# Patient Record
Sex: FEMALE | Race: WHITE | Employment: FULL TIME | URBAN - METROPOLITAN AREA
[De-identification: names, ages, dates, MRNs, and addresses within clinical notes are randomized per-mention and may not be internally consistent; named-entity substitution may affect disease eponyms.]

---

## 2017-03-27 DIAGNOSIS — R51.9 DAILY HEADACHE: ICD-10-CM

## 2017-03-28 RX ORDER — AMITRIPTYLINE HYDROCHLORIDE 10 MG/1
TABLET ORAL
Qty: 270 TABLET | Refills: 1 | Status: SHIPPED | OUTPATIENT
Start: 2017-03-28 | End: 2017-07-24

## 2017-06-28 ENCOUNTER — TELEPHONE (OUTPATIENT)
Dept: FAMILY MEDICINE | Facility: CLINIC | Age: 47
End: 2017-06-28

## 2017-06-28 DIAGNOSIS — D50.8 OTHER IRON DEFICIENCY ANEMIA: Primary | ICD-10-CM

## 2017-06-28 DIAGNOSIS — Z13.220 LIPID SCREENING: ICD-10-CM

## 2017-06-28 DIAGNOSIS — Z13.1 SCREENING FOR DIABETES MELLITUS: ICD-10-CM

## 2017-06-28 NOTE — TELEPHONE ENCOUNTER
Please enter order for mammo screening. Pt declines lumps, pain or implants. See note below.    Dianne Graves CMA  Diabetes and Nutrition Scheduling  Central Scheduling

## 2017-06-28 NOTE — TELEPHONE ENCOUNTER
Sarah called this morning to schedule her yearly visits. Mammo scheduled for 7/3/17 [facility]. Yearly fasting labs scheduled for 7/15/17 at [facility]. Physical scheduled with Vi on 7/19/17. May we ask Vi to order annual lab work for patient's upcoming lab visit? Also Please enter order for mammo screening. Pt declines lumps, pain or implants. Thank you.     Dianne Graves [facility]  Diabetes and Nutrition Scheduling  Central Scheduling

## 2017-06-28 NOTE — TELEPHONE ENCOUNTER
Need previsit labs-see orders and close encounter if nothing else needed./Patti Flores,       Physical 7/19/17. Please also enter mammogram order.

## 2017-07-03 ENCOUNTER — RADIANT APPOINTMENT (OUTPATIENT)
Dept: MAMMOGRAPHY | Facility: CLINIC | Age: 47
End: 2017-07-03
Payer: COMMERCIAL

## 2017-07-03 DIAGNOSIS — Z12.31 ENCOUNTER FOR MAMMOGRAM TO ESTABLISH BASELINE MAMMOGRAM: ICD-10-CM

## 2017-07-03 PROCEDURE — G0202 SCR MAMMO BI INCL CAD: HCPCS | Performed by: STUDENT IN AN ORGANIZED HEALTH CARE EDUCATION/TRAINING PROGRAM

## 2017-07-15 DIAGNOSIS — D50.8 OTHER IRON DEFICIENCY ANEMIA: ICD-10-CM

## 2017-07-15 DIAGNOSIS — Z13.1 SCREENING FOR DIABETES MELLITUS: ICD-10-CM

## 2017-07-15 DIAGNOSIS — Z13.220 LIPID SCREENING: ICD-10-CM

## 2017-07-15 LAB
BASOPHILS # BLD AUTO: 0 10E9/L (ref 0–0.2)
BASOPHILS NFR BLD AUTO: 0.5 %
DIFFERENTIAL METHOD BLD: ABNORMAL
EOSINOPHIL # BLD AUTO: 0.1 10E9/L (ref 0–0.7)
EOSINOPHIL NFR BLD AUTO: 1.9 %
ERYTHROCYTE [DISTWIDTH] IN BLOOD BY AUTOMATED COUNT: 13.7 % (ref 10–15)
HCT VFR BLD AUTO: 40.5 % (ref 35–47)
HGB BLD-MCNC: 13.2 G/DL (ref 11.7–15.7)
LYMPHOCYTES # BLD AUTO: 1.3 10E9/L (ref 0.8–5.3)
LYMPHOCYTES NFR BLD AUTO: 36.3 %
MCH RBC QN AUTO: 31.9 PG (ref 26.5–33)
MCHC RBC AUTO-ENTMCNC: 32.6 G/DL (ref 31.5–36.5)
MCV RBC AUTO: 98 FL (ref 78–100)
MONOCYTES # BLD AUTO: 0.3 10E9/L (ref 0–1.3)
MONOCYTES NFR BLD AUTO: 8.8 %
NEUTROPHILS # BLD AUTO: 1.9 10E9/L (ref 1.6–8.3)
NEUTROPHILS NFR BLD AUTO: 52.5 %
PLATELET # BLD AUTO: 224 10E9/L (ref 150–450)
RBC # BLD AUTO: 4.14 10E12/L (ref 3.8–5.2)
WBC # BLD AUTO: 3.6 10E9/L (ref 4–11)

## 2017-07-15 PROCEDURE — 82947 ASSAY GLUCOSE BLOOD QUANT: CPT | Performed by: PHYSICIAN ASSISTANT

## 2017-07-15 PROCEDURE — 82728 ASSAY OF FERRITIN: CPT | Performed by: PHYSICIAN ASSISTANT

## 2017-07-15 PROCEDURE — 36415 COLL VENOUS BLD VENIPUNCTURE: CPT | Performed by: PHYSICIAN ASSISTANT

## 2017-07-15 PROCEDURE — 80061 LIPID PANEL: CPT | Performed by: PHYSICIAN ASSISTANT

## 2017-07-15 PROCEDURE — 85025 COMPLETE CBC W/AUTO DIFF WBC: CPT | Performed by: PHYSICIAN ASSISTANT

## 2017-07-17 LAB
CHOLEST SERPL-MCNC: 132 MG/DL
FERRITIN SERPL-MCNC: 12 NG/ML (ref 8–252)
GLUCOSE SERPL-MCNC: 85 MG/DL (ref 70–99)
HDLC SERPL-MCNC: 74 MG/DL
LDLC SERPL CALC-MCNC: 49 MG/DL
NONHDLC SERPL-MCNC: 58 MG/DL
TRIGL SERPL-MCNC: 44 MG/DL

## 2017-07-18 ENCOUNTER — MYC MEDICAL ADVICE (OUTPATIENT)
Dept: FAMILY MEDICINE | Facility: CLINIC | Age: 47
End: 2017-07-18

## 2017-07-23 ENCOUNTER — OFFICE VISIT (OUTPATIENT)
Dept: URGENT CARE | Facility: URGENT CARE | Age: 47
End: 2017-07-23
Payer: COMMERCIAL

## 2017-07-23 VITALS
TEMPERATURE: 98.3 F | SYSTOLIC BLOOD PRESSURE: 115 MMHG | HEART RATE: 77 BPM | WEIGHT: 119.4 LBS | BODY MASS INDEX: 21.15 KG/M2 | DIASTOLIC BLOOD PRESSURE: 75 MMHG

## 2017-07-23 DIAGNOSIS — S61.215A LACERATION OF LEFT RING FINGER WITHOUT FOREIGN BODY WITHOUT DAMAGE TO NAIL, INITIAL ENCOUNTER: Primary | ICD-10-CM

## 2017-07-23 DIAGNOSIS — S61.217A LACERATION OF LEFT LITTLE FINGER WITHOUT FOREIGN BODY WITHOUT DAMAGE TO NAIL, INITIAL ENCOUNTER: ICD-10-CM

## 2017-07-23 PROCEDURE — 12001 RPR S/N/AX/GEN/TRNK 2.5CM/<: CPT | Performed by: FAMILY MEDICINE

## 2017-07-23 NOTE — PATIENT INSTRUCTIONS
Extremity Laceration: Sutures, Staples, or Tape  A laceration is a cut through the skin. If it is deep, it may require stitches (sutures) or staples to close so it can heal. Minor cuts may be treated with surgical tape closures.   X-rays may be done if something may have entered the skin through the cut. You may also need a tetanus shot if you are not up to date on this vaccination.  Home care    Follow the health care provider s instructions on how to care for the cut.    Wash your hands with soap and warm water before and after caring for your wound. This is to help prevent infection.    Keep the wound clean and dry. If a bandage was applied and it becomes wet or dirty, replace it. Otherwise, leave it in place for the first 24 hours, then change it once a day or as directed.    If sutures or staples were used, clean the wound daily:    After removing the bandage, wash the area with soap and water. Use a wet cotton swab to loosen and remove any blood or crust that forms.    After cleaning, keep the wound clean and dry. Talk with your doctor before applying any antibiotic ointment to the wound. Reapply the bandage.    You may remove the bandage to shower as usual after the first 24 hours, but do not soak the area in water (no swimming) until the stitches or staples are removed.    If surgical tape closures were used, keep the area clean and dry. If it becomes wet, blot it dry with a towel.    The doctor may prescribe an antibiotic cream or ointment to prevent infection. Do not stop taking this medication until you have finished the prescribed course or the doctor tells you to stop. The doctor may also prescribe medications for pain. Follow the doctor s instructions for taking these medications.    Avoid activities that may reopen your wound.  Follow-up care  Follow up with your health care provider. Most skin wounds heal within ten days. However, an infection may sometimes occur despite proper treatment.  Therefore, check the wound daily for the signs of infection listed below. Stitches and staples should be removed within 7 14 days. If surgical tape closures were used, you may remove them after 10 days if they have not fallen off by then.   When to seek medical advice  Call your health care provider right away if any of these occur:    Wound bleeding not controlled by direct pressure    Signs of infection, including increasing pain in the wound, increasing wound redness or swelling, or pus or bad odor coming from the wound    Fever of 100.4 F (38 C) or higher or as directed by your healthcare provider    Stitches or staples come apart or fall out or surgical tape falls off before 7 days    Wound edges re-open    Wound changes colors    Numbness around the wound     Decreased movement around the injured area  Date Last Reviewed: 6/14/2015 2000-2017 [employer]. [address] [place] [address]. All rights reserved. This information is not intended as a substitute for professional medical care. Always follow your healthcare professional's instructions.        Laceration: Skin Adhesive  A laceration is a cut through the skin. You have a laceration that your healthcare provider has closed with skin adhesive, a type of skin glue.  Home care  You may take acetaminophen or ibuprofen for pain, unless your provider prescribed another pain medicine.  If you have chronic liver or kidney disease or ever had a stomach ulcer or gastrointestinal bleeding, talk with your healthcare provider before using these medicines.  General care    Keep the wound clean and dry. You may shower or bathe as usual, but do not use soaps, lotions, or ointments on the wound area. Do not scrub the wound. After bathing, pat the wound dry with a soft towel.    Do not scratch, rub, or pick at the adhesive film. Do not place tape directly over the film.    Do not apply liquids (such as peroxide), ointments, or creams to the wound  while the film is in place.    Most skin wounds heal without problems. However, an infection sometimes occurs despite proper treatment. Therefore, watch for the signs of infection listed below.  Follow-up care  Follow up with your healthcare provider, or as advised. The adhesive film will fall off in 5 to 7 days.  When to seek medical advice  Call your healthcare provider right away if any of these occur:    Signs of infection:    Fever of 100.4 F (38 C) or higher, or as advised by your healthcare provider    Increasing pain in the wound    Increasing redness or swelling    Pus coming from the wound    Wound bleeds more than a small amount or bleeding doesn t stop    Glue comes off earlier than expected and the wound edges come apart    You feel numbness or weakness in the wound area that doesn t go away  Date Last Reviewed: 6/1/2016 2000-2017 [employer]. [address] [place] [address], [place] [address]. All rights reserved. This information is not intended as a substitute for professional medical care. Always follow your healthcare professional's instructions.

## 2017-07-23 NOTE — MR AVS SNAPSHOT
After Visit Summary   7/23/2017    Sarah Padilla    MRN: [id number]           Patient Information     Date Of Birth          1970        Visit Information        Provider Department      7/23/2017 4:35 PM Catherine Lopez MD [facility]        Today's Diagnoses     Laceration of left ring finger without foreign body without damage to nail, initial encounter    -  1    Laceration of left little finger without foreign body without damage to nail, initial encounter          Care Instructions      Extremity Laceration: Sutures, Staples, or Tape  A laceration is a cut through the skin. If it is deep, it may require stitches (sutures) or staples to close so it can heal. Minor cuts may be treated with surgical tape closures.   X-rays may be done if something may have entered the skin through the cut. You may also need a tetanus shot if you are not up to date on this vaccination.  Home care    Follow the health care provider s instructions on how to care for the cut.    Wash your hands with soap and warm water before and after caring for your wound. This is to help prevent infection.    Keep the wound clean and dry. If a bandage was applied and it becomes wet or dirty, replace it. Otherwise, leave it in place for the first 24 hours, then change it once a day or as directed.    If sutures or staples were used, clean the wound daily:    After removing the bandage, wash the area with soap and water. Use a wet cotton swab to loosen and remove any blood or crust that forms.    After cleaning, keep the wound clean and dry. Talk with your doctor before applying any antibiotic ointment to the wound. Reapply the bandage.    You may remove the bandage to shower as usual after the first 24 hours, but do not soak the area in water (no swimming) until the stitches or staples are removed.    If surgical tape closures were used, keep the area clean and dry. If it becomes wet, blot it dry with a  towel.    The doctor may prescribe an antibiotic cream or ointment to prevent infection. Do not stop taking this medication until you have finished the prescribed course or the doctor tells you to stop. The doctor may also prescribe medications for pain. Follow the doctor s instructions for taking these medications.    Avoid activities that may reopen your wound.  Follow-up care  Follow up with your health care provider. Most skin wounds heal within ten days. However, an infection may sometimes occur despite proper treatment. Therefore, check the wound daily for the signs of infection listed below. Stitches and staples should be removed within 7-14 days. If surgical tape closures were used, you may remove them after 10 days if they have not fallen off by then.   When to seek medical advice  Call your health care provider right away if any of these occur:    Wound bleeding not controlled by direct pressure    Signs of infection, including increasing pain in the wound, increasing wound redness or swelling, or pus or bad odor coming from the wound    Fever of 100.4 F (38 C) or higher or as directed by your healthcare provider    Stitches or staples come apart or fall out or surgical tape falls off before 7 days    Wound edges re-open    Wound changes colors    Numbness around the wound     Decreased movement around the injured area  Date Last Reviewed: 6/14/2015 2000-2017 [employer]. [address] [place] [address], [place] [address]. All rights reserved. This information is not intended as a substitute for professional medical care. Always follow your healthcare professional's instructions.        Laceration: Skin Adhesive  A laceration is a cut through the skin. You have a laceration that your healthcare provider has closed with skin adhesive, a type of skin glue.  Home care  You may take acetaminophen or ibuprofen for pain, unless your provider prescribed another pain medicine.  If you have chronic liver or  kidney disease or ever had a stomach ulcer or gastrointestinal bleeding, talk with your healthcare provider before using these medicines.  General care    Keep the wound clean and dry. You may shower or bathe as usual, but do not use soaps, lotions, or ointments on the wound area. Do not scrub the wound. After bathing, pat the wound dry with a soft towel.    Do not scratch, rub, or pick at the adhesive film. Do not place tape directly over the film.    Do not apply liquids (such as peroxide), ointments, or creams to the wound while the film is in place.    Most skin wounds heal without problems. However, an infection sometimes occurs despite proper treatment. Therefore, watch for the signs of infection listed below.  Follow-up care  Follow up with your healthcare provider, or as advised. The adhesive film will fall off in 5 to 7 days.  When to seek medical advice  Call your healthcare provider right away if any of these occur:    Signs of infection:    Fever of 100.4 F (38 C) or higher, or as advised by your healthcare provider    Increasing pain in the wound    Increasing redness or swelling    Pus coming from the wound    Wound bleeds more than a small amount or bleeding doesn t stop    Glue comes off earlier than expected and the wound edges come apart    You feel numbness or weakness in the wound area that doesn t go away  Date Last Reviewed: 6/1/2016 2000-2017 [employer]. [address] [place] [address]. All rights reserved. This information is not intended as a substitute for professional medical care. Always follow your healthcare professional's instructions.                Follow-ups after your visit        Your next 10 appointments already scheduled     Jul 24, 2017  2:10 PM CDT   PHYS[facility] with Kristen M Kehr, [facility]-C   [facility]    [address] [facility] [address] [phone]              Who to contact     If you have  questions or need follow up information about today's clinic visit or your schedule please contact [facility] directly at [phone].  Normal or non-critical lab and imaging results will be communicated to you by MyChart, letter or phone within 4 business days after the clinic has received the results. If you do not hear from us within 7 days, please contact the clinic through [employer] or phone. If you have a critical or abnormal lab result, we will notify you by phone as soon as possible.  Submit refill requests through [employer] or call your pharmacy and they will forward the refill request to us. Please allow 3 business days for your refill to be completed.          Additional Information About Your Visit        [employer] Information     [employer] gives you secure access to your electronic health record. If you see a primary care provider, you can also send messages to your care team and make appointments. If you have questions, please call your primary care clinic.  If you do not have a primary care provider, please call [phone] and they will assist you.        Care EveryWhere ID     This is your Care EveryWhere ID. This could be used by other organizations to access your [place] medical records  [id number]        Your Vitals Were     Pulse Temperature BMI (Body Mass Index)             77 98.3  F (36.8  C) (Oral) 21.15 kg/m2          Blood Pressure from Last 3 Encounters:   07/23/17 115/75   09/26/16 125/77   04/25/16 121/81    Weight from Last 3 Encounters:   07/23/17 119 lb 6.4 oz (54.2 kg)   09/26/16 126 lb (57.2 kg)   04/25/16 125 lb (56.7 kg)              We Performed the Following     N BLOCK INJ, COMMON DIGIT     REPAIR SUPERFICIAL, WOUND BODY < =2.5CM     REPAIR SUPERFICIAL, WOUND BODY < =2.5CM     WOUND CLOSURE BY ADHESIVE        Primary Care Provider Office Phone # Fax #    Kristen M Kehr, PA-C [phone]       [facility] [address] [facility]  [address]        Equal Access to Services     [facility]: Hadii jose peoples keila Clarke, wajoseda luqadaha, qaybta kakyungtammy wagner, frederic tyler. So [facility] [phone].    ATENCIÓN: Si habla español, tiene a allen disposición servicios gratuitos de asistencia lingüística. Faizaame al [phone].    We comply with applicable federal civil rights laws and [place] laws. We do not discriminate on the basis of race, color, national origin, age, disability sex, sexual orientation or gender identity.            Thank you!     Thank you for choosing [facility]  [facility]. Our goal is always to provide you with excellent care. Hearing back from our patients is one way we can continue to improve our services. Please take a few minutes to complete the written survey that you may receive in the mail after your visit with us. Thank you!             Your Updated Medication List - Protect others around you: Learn how to safely use, store and throw away your medicines at www.disposemymeds.org.          This list is accurate as of: 7/23/17  5:39 PM.  Always use your most recent med list.                   Brand Name Dispense Instructions for use Diagnosis    amitriptyline 10 MG tablet    ELAVIL    270 tablet    3 tabs (30 mg) by mouth at bedtime for prevention of headaches.    Daily headache       eletriptan 20 MG tablet    RELPAX    18 tablet    Take 1-2 tablets (20-40 mg) by mouth at onset of headache for migraine May repeat in 2 hours. Max 4 tablets/24 hours.    Migraine without aura and without status migrainosus, not intractable       ferrous sulfate 325 (65 FE) MG tablet    IRON    180 tablet    Take 1 tablet (325 mg) by mouth 2 times daily    Other iron deficiency anemia       ibuprofen 200 MG tablet   Generic drug:  ibuprofen      Take 200 mg by mouth every 4 hours as needed.        UNISOM PO      Take 1 tablet by mouth nightly as needed.

## 2017-07-23 NOTE — PROGRESS NOTES
SUBJECTIVE:                                                    Sarah Padilla is a 47 year old female who presents to clinic today for the following health issues:            Duration: today    Description (location/character/radiation): ring and pinky finger on left hand    Intensity:  moderate    Accompanying signs and symptoms: cut with knife while cooking     History (similar episodes/previous evaluation): None    Precipitating or alleviating factors: None    Therapies tried and outcome: applied pressure    Last tetanus booster within 10 years: yes     No Known Allergies    Past Medical History:   Diagnosis Date     Daily headache 10/2/2016     Inflammatory arthritis 4/4/2012     NO ACTIVE PROBLEMS          Current Outpatient Prescriptions on File Prior to Visit:  amitriptyline (ELAVIL) 10 MG tablet 3 tabs (30 mg) by mouth at bedtime for prevention of headaches.   eletriptan (RELPAX) 20 MG tablet Take 1-2 tablets (20-40 mg) by mouth at onset of headache for migraine May repeat in 2 hours. Max 4 tablets/24 hours.   ferrous sulfate (IRON) 325 (65 FE) MG tablet Take 1 tablet (325 mg) by mouth 2 times daily   Doxylamine Succinate, Sleep, (UNISOM PO) Take 1 tablet by mouth nightly as needed.   ibuprofen (IBU-200) 200 MG tablet Take 200 mg by mouth every 4 hours as needed.     No current facility-administered medications on file prior to visit.       ROS:  GEN: no fever or chills  CARDIAC: no chest pain or shortness of breath  SKIN: as above  Musculoskeletal: as above      OBJECTIVE:  Blood pressure 115/75, pulse 77, temperature 98.3  F (36.8  C), temperature source Oral, weight 119 lb 6.4 oz (54.2 kg), not currently breastfeeding.     The patient appears today in no acute distress.  Awake alert not in any acute cardiorespiratory distress  Psych: pleasant   Neurologic: No gross neurologic deficits  Skin: no obvious lesions or rashes  Laceration LOCATION: 2 lacerations  1. Left ring finger: radial side perpendicular  to the finger just going straight radially from the mid length of the nail about 1 cm bleeding mild going to subcutaneous tissue  2. Left pinky: radial side perpendicular to the finger just going straight radially from the mid length of the nail about 0.5 cm bleeding mild superficial      Tendon function, sensation intact. No weakness. Good pulses. Good range of motion  Cap refill intact.  Wound clean. No Foreign body noted.     Assessment:      ICD-10-CM    1. Laceration of left ring finger without foreign body without damage to nail, initial encounter S61.215A REPAIR SUPERFICIAL, WOUND BODY < =2.5CM   2. Laceration of left little finger without foreign body without damage to nail, initial encounter S61.217A REPAIR SUPERFICIAL, WOUND BODY < =2.5CM     WOUND CLOSURE BY ADHESIVE       PLAN:  Oral consent received.  Patient aware of risks which include but are not limited to infection, bleeding, iatrogenic injury. Alternative treatment plan was also discussed  Copious irrigation with normal saline done.  Digital block done at the left ring finger using lidocaine ONLY 1%  good anesthesia achieved.    Wound cleaned with saline. No FBs.    Laceration was closed using 2interrupted sutures using 5 ethilon  Bacitracin dressing.  Patient tolerated procedure well.      Left pinky repaired using dermabond     Advised to watch for signs or symptoms of infection. If with any concerns of infection advised to come in immediately to be reassessed. Routine wound care discussed.   Suture removal in 7-10 days for the ring finger.     Catherine Lopez MD

## 2017-07-24 ENCOUNTER — OFFICE VISIT (OUTPATIENT)
Dept: FAMILY MEDICINE | Facility: CLINIC | Age: 47
End: 2017-07-24
Payer: COMMERCIAL

## 2017-07-24 VITALS
SYSTOLIC BLOOD PRESSURE: 121 MMHG | HEART RATE: 64 BPM | WEIGHT: 119 LBS | DIASTOLIC BLOOD PRESSURE: 81 MMHG | BODY MASS INDEX: 21.09 KG/M2 | TEMPERATURE: 98.3 F | HEIGHT: 63 IN

## 2017-07-24 DIAGNOSIS — Z00.00 ROUTINE GENERAL MEDICAL EXAMINATION AT A HEALTH CARE FACILITY: Primary | ICD-10-CM

## 2017-07-24 DIAGNOSIS — M21.612 BILATERAL BUNIONS: ICD-10-CM

## 2017-07-24 DIAGNOSIS — M21.611 BILATERAL BUNIONS: ICD-10-CM

## 2017-07-24 DIAGNOSIS — D50.8 OTHER IRON DEFICIENCY ANEMIA: ICD-10-CM

## 2017-07-24 DIAGNOSIS — R51.9 DAILY HEADACHE: ICD-10-CM

## 2017-07-24 PROCEDURE — 99396 PREV VISIT EST AGE 40-64: CPT | Performed by: PHYSICIAN ASSISTANT

## 2017-07-24 RX ORDER — AMITRIPTYLINE HYDROCHLORIDE 10 MG/1
TABLET ORAL
Qty: 270 TABLET | Refills: 1 | Status: SHIPPED | OUTPATIENT
Start: 2017-07-24 | End: 2018-11-12

## 2017-07-24 RX ORDER — FERROUS SULFATE 325(65) MG
325 TABLET ORAL 2 TIMES DAILY
Qty: 180 TABLET | Refills: 3 | Status: SHIPPED | OUTPATIENT
Start: 2017-07-24

## 2017-07-24 NOTE — MR AVS SNAPSHOT
After Visit Summary   7/24/2017    Sarah Padilla    MRN: [id number]           Patient Information     Date Of Birth          1970        Visit Information        Provider Department      7/24/2017 2:10 PM Kehr, Kristen M, PA-C [facility]        Today's Diagnoses     Routine general medical examination at a health care facility    -  1    Daily headache        Other iron deficiency anemia        Bilateral bunions          Care Instructions      Preventive Health Recommendations  Female Ages 40 to 49    Yearly exam:     See your health care provider every year in order to  1. Review health changes.   2. Discuss preventive care.    3. Review your medicines if your doctor prescribed any.      Get a Pap test every three years (unless you have an abnormal result and your provider advises testing more often).      If you get Pap tests with HPV test, you only need to test every 5 years, unless you have an abnormal result. You do not need a Pap test if your uterus was removed (hysterectomy) and you have not had cancer.      You should be tested each year for STDs (sexually transmitted diseases), if you're at risk.       Ask your doctor if you should have a mammogram.      Have a colonoscopy (test for colon cancer) if someone in your family has had colon cancer or polyps before age 50.       Have a cholesterol test every 5 years.       Have a diabetes test (fasting glucose) after age 45. If you are at risk for diabetes, you should have this test every 3 years.    Shots: Get a flu shot each year. Get a tetanus shot every 10 years.     Nutrition:     Eat at least 5 servings of fruits and vegetables each day.    Eat whole-grain bread, whole-wheat pasta and brown rice instead of white grains and rice.    Talk to your provider about Calcium and Vitamin D.     Lifestyle    Exercise at least 150 minutes a week (an average of 30 minutes a day, 5 days a week). This will help you control your weight and  prevent disease.    Limit alcohol to one drink per day.    No smoking.     Wear sunscreen to prevent skin cancer.    See your dentist every six months for an exam and cleaning.          Follow-ups after your visit        Additional Services     PODIATRY/FOOT & ANKLE SURGERY REFERRAL       Your provider has referred you to: FMG: [facility] [phone]   [email or web address]  FM[facility]: [facility] [phone]   [email or web address]    Please be aware that coverage of these services is subject to the terms and limitations of your health insurance plan.  Call member services at your health plan with any benefit or coverage questions.      Please bring the following to your appointment:  >>   Any x-rays, CTs or MRIs which have been performed.  Contact the facility where they were done to arrange for  prior to your scheduled appointment.    >>   List of current medications   >>   This referral request   >>   Any documents/labs given to you for this referral                  Who to contact     If you have questions or need follow up information about today's clinic visit or your schedule please contact [facility] directly at [phone].  Normal or non-critical lab and imaging results will be communicated to you by MyChart, letter or phone within 4 business days after the clinic has received the results. If you do not hear from us within 7 days, please contact the clinic through MyCarehart or phone. If you have a critical or abnormal lab result, we will notify you by phone as soon as possible.  Submit refill requests through Comply365 or call your pharmacy and they will forward the refill request to us. Please allow 3 business days for your refill to be completed.          Additional Information About Your Visit        MyCareharCalm Information     Comply365 gives you secure access to your electronic health record. If you see a  "primary care provider, you can also send messages to your care team and make appointments. If you have questions, please call your primary care clinic.  If you do not have a primary care provider, please call [phone] and they will assist you.        Care EveryWhere ID     This is your Care EveryWhere ID. This could be used by other organizations to access your [facility] medical records  [id number]        Your Vitals Were     Pulse Temperature Height BMI (Body Mass Index)          64 98.3  F (36.8  C) (Oral) 5' 2.75\" (1.594 m) 21.25 kg/m2         Blood Pressure from Last 3 Encounters:   07/24/17 121/81   07/23/17 115/75   09/26/16 125/77    Weight from Last 3 Encounters:   07/24/17 119 lb (54 kg)   07/23/17 119 lb 6.4 oz (54.2 kg)   09/26/16 126 lb (57.2 kg)              We Performed the Following     PODIATRY/FOOT & ANKLE SURGERY REFERRAL          Where to get your medicines      These medications were sent to [facility] [address] [place] [address] - [facility] [place] [address] [facility] [address]     Phone:  [phone]     amitriptyline 10 MG tablet    ferrous sulfate 325 (65 FE) MG tablet          Primary Care Provider Office Phone # Fax #    Kristen M Kehr, PA-C [phone]       [facility] [address] [facility] [address]        Equal Access to Services     LINDA CRANE : Hadii aad ku hadasho Soomaali, waaxda luqadaha, qaybta kaalmada adeegyada, frederic tyler. So [facility] [phone].    ATENCIÓN: Si crystalla osorio, tiene a allen disposición servicios gratuitos de asistencia lingüística. Llame al [phone].    We comply with applicable federal civil rights laws and [place] laws. We do not discriminate on the basis of race, color, national origin, age, disability sex, sexual orientation or gender identity.            Thank you!     Thank you for choosing [facility]  for your care. Our goal is always to provide you with " excellent care. Hearing back from our patients is one way we can continue to improve our services. Please take a few minutes to complete the written survey that you may receive in the mail after your visit with us. Thank you!             Your Updated Medication List - Protect others around you: Learn how to safely use, store and throw away your medicines at www.disposemymeds.org.          This list is accurate as of: 7/24/17  2:33 PM.  Always use your most recent med list.                   Brand Name Dispense Instructions for use Diagnosis    amitriptyline 10 MG tablet    ELAVIL    270 tablet    3 tabs (30 mg) by mouth at bedtime for prevention of headaches.    Daily headache       eletriptan 20 MG tablet    RELPAX    18 tablet    Take 1-2 tablets (20-40 mg) by mouth at onset of headache for migraine May repeat in 2 hours. Max 4 tablets/24 hours.    Migraine without aura and without status migrainosus, not intractable       ferrous sulfate 325 (65 FE) MG tablet    IRON    180 tablet    Take 1 tablet (325 mg) by mouth 2 times daily    Other iron deficiency anemia       ibuprofen 200 MG tablet   Generic drug:  ibuprofen      Take 200 mg by mouth every 4 hours as needed.        UNISOM PO      Take 1 tablet by mouth nightly as needed.

## 2017-07-24 NOTE — NURSING NOTE
"Chief Complaint   Patient presents with     Physical       Initial /81  Pulse 64  Temp 98.3  F (36.8  C) (Oral)  Ht 5' 2.75\" (1.594 m)  Wt 119 lb (54 kg)  BMI 21.25 kg/m2 Estimated body mass index is 21.25 kg/(m^2) as calculated from the following:    Height as of this encounter: 5' 2.75\" (1.594 m).    Weight as of this encounter: 119 lb (54 kg).  Medication Reconciliation: complete    SAAD Fabian MA    "

## 2017-07-24 NOTE — PROGRESS NOTES
SUBJECTIVE:   CC: Sarah Padilla is an 47 year old woman who presents for preventive health visit.     Physical   Annual:     Getting at least 3 servings of Calcium per day::  Yes    Bi-annual eye exam::  NO    Dental care twice a year::  Yes    Sleep apnea or symptoms of sleep apnea::  None    Diet::  Other    Frequency of exercise::  4-5 days/week    Duration of exercise::  45-60 minutes    Taking medications regularly::  Yes    Medication side effects::  None    Additional concerns today::  YES        Today's PHQ-2 Score: PHQ-2 ( 1999 [employer]) 7/21/2017   Q1: Little interest or pleasure in doing things 0   Q2: Feeling down, depressed or hopeless 0   PHQ-2 Score 0   Q1: Little interest or pleasure in doing things Not at all   Q2: Feeling down, depressed or hopeless Not at all   PHQ-2 Score 0       Abuse: Current or Past(Physical, Sexual or Emotional)- No  Do you feel safe in your environment - Yes    Social History   Substance Use Topics     Smoking status: Never Smoker     Smokeless tobacco: Never Used     Alcohol use Yes      Comment: occasionally; socially     The patient does not drink >3 drinks per day nor >7 drinks per week.    Reviewed orders with patient.  Reviewed health maintenance and updated orders accordingly - Yes  Labs reviewed in EPIC  BP Readings from Last 3 Encounters:   07/24/17 121/81   07/23/17 115/75   09/26/16 125/77    Wt Readings from Last 3 Encounters:   07/24/17 119 lb (54 kg)   07/23/17 119 lb 6.4 oz (54.2 kg)   09/26/16 126 lb (57.2 kg)                  Patient Active Problem List   Diagnosis     CARDIOVASCULAR SCREENING; LDL GOAL LESS THAN 160     Inflammatory arthritis     Other iron deficiency anemia     Migraine without aura and without status migrainosus, not intractable     Daily headache     Past Surgical History:   Procedure Laterality Date     No History of Surgery         Social History   Substance Use Topics     Smoking status: Never Smoker     Smokeless tobacco: Never Used  "    Alcohol use Yes      Comment: occasionally; socially     Family History   Problem Relation Age of Onset     Alzheimer Disease Father      Hypertension Father      Respiratory Maternal Grandmother      Emphysema     CANCER Maternal Grandfather      Lung/Skin               Patient under age 50, mutual decision reflected in health maintenance.        Pertinent mammograms are reviewed under the imaging tab.  History of abnormal Pap smear:   NO - age 30- 65 PAP every 3 years recommended  Last 3 Pap Results:   PAP (no units)   Date Value   04/25/2016 NIL   03/29/2011 NIL       Reviewed and updated as needed this visit by clinical staffTobacco  Allergies  Meds  Med Hx  Surg Hx  Fam Hx  Soc Hx        Reviewed and updated as needed this visit by Provider        Past Medical History:   Diagnosis Date     Daily headache 10/2/2016     Daily headache      Inflammatory arthritis 4/4/2012     NO ACTIVE PROBLEMS       Past Surgical History:   Procedure Laterality Date     No History of Surgery         ROS:  C: NEGATIVE for fever, chills, change in weight  I: NEGATIVE for worrisome rashes, moles or lesions  E: NEGATIVE for vision changes or irritation  ENT: NEGATIVE for ear, mouth and throat problems  R: NEGATIVE for significant cough or SOB  B: NEGATIVE for masses, tenderness or discharge  CV: NEGATIVE for chest pain, palpitations or peripheral edema  GI: NEGATIVE for nausea, abdominal pain, heartburn, or change in bowel habits  : NEGATIVE for unusual urinary or vaginal symptoms. Periods are regular.  M: NEGATIVE for significant arthralgias or myalgia  N: NEGATIVE for weakness, dizziness or paresthesias  E: NEGATIVE for temperature intolerance, skin/hair changes  P: NEGATIVE for changes in mood or affect     OBJECTIVE:   /81  Pulse 64  Temp 98.3  F (36.8  C) (Oral)  Ht 5' 2.75\" (1.594 m)  Wt 119 lb (54 kg)  BMI 21.25 kg/m2  EXAM:  GENERAL: healthy, alert and no distress  EYES: Eyes grossly normal to " inspection, PERRL and conjunctivae and sclerae normal  HENT: ear canals and TM's normal, nose and mouth without ulcers or lesions  NECK: no adenopathy, no asymmetry, masses, or scars and thyroid normal to palpation  RESP: lungs clear to auscultation - no rales, rhonchi or wheezes  BREAST: normal without masses, tenderness or nipple discharge and no palpable axillary masses or adenopathy  CV: regular rate and rhythm, normal S1 S2, no S3 or S4, no murmur, click or rub, no peripheral edema and peripheral pulses strong  ABDOMEN: soft, nontender, no hepatosplenomegaly, no masses and bowel sounds normal  MS:She has bilateral bunions.   SKIN: no suspicious lesions or rashes  NEURO: Normal strength and tone, mentation intact and speech normal  PSYCH: mentation appears normal, affect normal/bright      Results for orders placed or performed in visit on 07/15/17   Ferritin   Result Value Ref Range    Ferritin 12 8 - 252 ng/mL   CBC with platelets differential   Result Value Ref Range    WBC 3.6 (L) 4.0 - 11.0 10e9/L    RBC Count 4.14 3.8 - 5.2 10e12/L    Hemoglobin 13.2 11.7 - 15.7 g/dL    Hematocrit 40.5 35.0 - 47.0 %    MCV 98 78 - 100 fl    MCH 31.9 26.5 - 33.0 pg    MCHC 32.6 31.5 - 36.5 g/dL    RDW 13.7 10.0 - 15.0 %    Platelet Count 224 150 - 450 10e9/L    Diff Method Automated Method     % Neutrophils 52.5 %    % Lymphocytes 36.3 %    % Monocytes 8.8 %    % Eosinophils 1.9 %    % Basophils 0.5 %    Absolute Neutrophil 1.9 1.6 - 8.3 10e9/L    Absolute Lymphocytes 1.3 0.8 - 5.3 10e9/L    Absolute Monocytes 0.3 0.0 - 1.3 10e9/L    Absolute Eosinophils 0.1 0.0 - 0.7 10e9/L    Absolute Basophils 0.0 0.0 - 0.2 10e9/L   Lipid panel reflex to direct LDL   Result Value Ref Range    Cholesterol 132 <200 mg/dL    Triglycerides 44 <150 mg/dL    HDL Cholesterol 74 >49 mg/dL    LDL Cholesterol Calculated 49 <100 mg/dL    Non HDL Cholesterol 58 <130 mg/dL   Glucose   Result Value Ref Range    Glucose 85 70 - 99 mg/dL  "        ASSESSMENT/PLAN:   1. Routine general medical examination at a health care facility  Health maintenance reviewed and updated.    2. Daily headache  Stable, improved since she has been eating healthy and exercising.   She continues to use the elavil, but has not needed the relpax in months.   - amitriptyline (ELAVIL) 10 MG tablet; 3 tabs (30 mg) by mouth at bedtime for prevention of headaches.  Dispense: 270 tablet; Refill: 1    3. Other iron deficiency anemia  Stable with the iron supplements.   Consider decreasing to once daily supplements  - ferrous sulfate (IRON) 325 (65 FE) MG tablet; Take 1 tablet (325 mg) by mouth 2 times daily  Dispense: 180 tablet; Refill: 3    4. Bilateral bunions  - PODIATRY/FOOT & ANKLE SURGERY REFERRAL    COUNSELING:  Reviewed preventive health counseling, as reflected in patient instructions       Regular exercise       Healthy diet/nutrition       reports that she has never smoked. She has never used smokeless tobacco.    Estimated body mass index is 21.25 kg/(m^2) as calculated from the following:    Height as of this encounter: 5' 2.75\" (1.594 m).    Weight as of this encounter: 119 lb (54 kg).         Counseling Resources:  ATP IV Guidelines  Pooled Cohorts Equation Calculator  Breast Cancer Risk Calculator  FRAX Risk Assessment  ICSI Preventive Guidelines  Dietary Guidelines for Americans, 2010  USDA's MyPlate  ASA Prophylaxis  Lung CA Screening    Kristen M. Kehr, PA-C  [facility]  "

## 2018-03-10 DIAGNOSIS — G43.009 MIGRAINE WITHOUT AURA AND WITHOUT STATUS MIGRAINOSUS, NOT INTRACTABLE: ICD-10-CM

## 2018-03-13 RX ORDER — RIZATRIPTAN BENZOATE 10 MG/1
TABLET, ORALLY DISINTEGRATING ORAL
Qty: 18 TABLET | Refills: 3 | OUTPATIENT
Start: 2018-03-13

## 2018-03-29 ENCOUNTER — MYC REFILL (OUTPATIENT)
Dept: FAMILY MEDICINE | Facility: CLINIC | Age: 48
End: 2018-03-29

## 2018-03-29 DIAGNOSIS — G43.009 MIGRAINE WITHOUT AURA AND WITHOUT STATUS MIGRAINOSUS, NOT INTRACTABLE: ICD-10-CM

## 2018-03-29 RX ORDER — ELETRIPTAN HYDROBROMIDE 20 MG/1
20-40 TABLET, FILM COATED ORAL
Qty: 18 TABLET | Refills: 1 | Status: CANCELLED | OUTPATIENT
Start: 2018-03-29

## 2018-03-29 RX ORDER — RIZATRIPTAN BENZOATE 10 MG/1
10 TABLET, ORALLY DISINTEGRATING ORAL
Qty: 18 TABLET | Refills: 0 | Status: SHIPPED | OUTPATIENT
Start: 2018-03-29 | End: 2018-11-12

## 2018-03-29 NOTE — TELEPHONE ENCOUNTER
Message from [employer]:  Original authorizing provider: Kristen M. Kehr, PA-C [facility] would like a refill of the following medications:  eletriptan (RELPAX) 20 MG tablet [Kristen M. Kehr, PA-C]    Preferred pharmacy: [facility] [address] [place], [place] - [address] [facility]    Comment:  I am not taking eletriptan - it was changed to rizatriptan dissolvable. I take these periodically for migraines, and am almost out. Can I have a renewal of this medication please? [facility] in [place].

## 2018-03-29 NOTE — TELEPHONE ENCOUNTER
Per protocol, will route encounter to be cosigned by provider for Verbal Orders.  Med list is updated.    Yas Schaffer RN

## 2018-10-19 ENCOUNTER — RADIANT APPOINTMENT (OUTPATIENT)
Dept: MAMMOGRAPHY | Facility: CLINIC | Age: 48
End: 2018-10-19
Attending: PHYSICIAN ASSISTANT
Payer: COMMERCIAL

## 2018-10-19 DIAGNOSIS — Z12.31 VISIT FOR SCREENING MAMMOGRAM: ICD-10-CM

## 2018-10-19 PROCEDURE — 77067 SCR MAMMO BI INCL CAD: CPT | Performed by: RADIOLOGY

## 2018-11-12 ENCOUNTER — OFFICE VISIT (OUTPATIENT)
Dept: FAMILY MEDICINE | Facility: CLINIC | Age: 48
End: 2018-11-12
Payer: COMMERCIAL

## 2018-11-12 VITALS
SYSTOLIC BLOOD PRESSURE: 108 MMHG | DIASTOLIC BLOOD PRESSURE: 78 MMHG | OXYGEN SATURATION: 100 % | HEIGHT: 63 IN | HEART RATE: 68 BPM | BODY MASS INDEX: 23.04 KG/M2 | TEMPERATURE: 97.6 F | WEIGHT: 130 LBS | RESPIRATION RATE: 16 BRPM

## 2018-11-12 DIAGNOSIS — Z12.4 SCREENING FOR MALIGNANT NEOPLASM OF CERVIX: ICD-10-CM

## 2018-11-12 DIAGNOSIS — Z00.00 ROUTINE GENERAL MEDICAL EXAMINATION AT A HEALTH CARE FACILITY: Primary | ICD-10-CM

## 2018-11-12 DIAGNOSIS — G43.009 MIGRAINE WITHOUT AURA AND WITHOUT STATUS MIGRAINOSUS, NOT INTRACTABLE: ICD-10-CM

## 2018-11-12 PROCEDURE — G0145 SCR C/V CYTO,THINLAYER,RESCR: HCPCS | Performed by: PHYSICIAN ASSISTANT

## 2018-11-12 PROCEDURE — 87624 HPV HI-RISK TYP POOLED RSLT: CPT | Performed by: PHYSICIAN ASSISTANT

## 2018-11-12 PROCEDURE — 99396 PREV VISIT EST AGE 40-64: CPT | Performed by: PHYSICIAN ASSISTANT

## 2018-11-12 RX ORDER — RIZATRIPTAN BENZOATE 10 MG/1
10 TABLET, ORALLY DISINTEGRATING ORAL
Qty: 18 TABLET | Refills: 3 | Status: SHIPPED | OUTPATIENT
Start: 2018-11-12 | End: 2019-12-05

## 2018-11-12 ASSESSMENT — ENCOUNTER SYMPTOMS
MYALGIAS: 0
EYE PAIN: 0
NAUSEA: 0
ABDOMINAL PAIN: 0
SHORTNESS OF BREATH: 0
NERVOUS/ANXIOUS: 0
DYSURIA: 0
DIZZINESS: 0
FREQUENCY: 0
DIARRHEA: 0
PARESTHESIAS: 0
COUGH: 0
CHILLS: 0
ARTHRALGIAS: 0
FEVER: 0
HEMATURIA: 0
HEARTBURN: 0
HEADACHES: 0
CONSTIPATION: 0
PALPITATIONS: 0
JOINT SWELLING: 0
BREAST MASS: 0
WEAKNESS: 0
SORE THROAT: 0
HEMATOCHEZIA: 0

## 2018-11-12 ASSESSMENT — PAIN SCALES - GENERAL: PAINLEVEL: NO PAIN (0)

## 2018-11-12 NOTE — MR AVS SNAPSHOT
After Visit Summary   11/12/2018    Sarah Padilla    MRN: [id number]           Patient Information     Date Of Birth          1970        Visit Information        Provider Department      11/12/2018 5:00 PM Kehr, Kristen M, PA-C [facility]        Today's Diagnoses     Routine general medical examination at a health care facility    -  1    Migraine without aura and without status migrainosus, not intractable        Screening for malignant neoplasm of cervix          Care Instructions      Preventive Health Recommendations  Female Ages 40 to 49    Yearly exam:     See your health care provider every year in order to  1. Review health changes.   2. Discuss preventive care.    3. Review your medicines if your doctor prescribed any.      Get a Pap test every three years (unless you have an abnormal result and your provider advises testing more often).      If you get Pap tests with HPV test, you only need to test every 5 years, unless you have an abnormal result. You do not need a Pap test if your uterus was removed (hysterectomy) and you have not had cancer.      You should be tested each year for STDs (sexually transmitted diseases), if you're at risk.     Ask your doctor if you should have a mammogram.      Have a colonoscopy (test for colon cancer) if someone in your family has had colon cancer or polyps before age 50.       Have a cholesterol test every 5 years.       Have a diabetes test (fasting glucose) after age 45. If you are at risk for diabetes, you should have this test every 3 years.    Shots: Get a flu shot each year. Get a tetanus shot every 10 years.     Nutrition:     Eat at least 5 servings of fruits and vegetables each day.    Eat whole-grain bread, whole-wheat pasta and brown rice instead of white grains and rice.    Get adequate Calcium and Vitamin D.      Lifestyle    Exercise at least 150 minutes a week (an average of 30 minutes a day, 5 days a week). This will  "help you control your weight and prevent disease.    Limit alcohol to one drink per day.    No smoking.     Wear sunscreen to prevent skin cancer.    See your dentist every six months for an exam and cleaning.          Follow-ups after your visit        Follow-up notes from your care team     Return in about 1 year (around 11/12/2019) for Routine Visit.      Who to contact     If you have questions or need follow up information about today's clinic visit or your schedule please contact [facility] directly at [phone].  Normal or non-critical lab and imaging results will be communicated to you by Apportablehart, letter or phone within 4 business days after the clinic has received the results. If you do not hear from us within 7 days, please contact the clinic through Gymboxt or phone. If you have a critical or abnormal lab result, we will notify you by phone as soon as possible.  Submit refill requests through Caterva or call your pharmacy and they will forward the refill request to us. Please allow 3 business days for your refill to be completed.          Additional Information About Your Visit        Apportablehart Information     Caterva gives you secure access to your electronic health record. If you see a primary care provider, you can also send messages to your care team and make appointments. If you have questions, please call your primary care clinic.  If you do not have a primary care provider, please call [phone] and they will assist you.        Care EveryWhere ID     This is your Care EveryWhere ID. This could be used by other organizations to access your [facility] medical records  [id number]        Your Vitals Were     Pulse Temperature Respirations Height Last Period Pulse Oximetry    68 97.6  F (36.4  C) (Oral) 16 5' 3\" (1.6 m) 11/01/2018 100%    BMI (Body Mass Index)                   23.03 kg/m2            Blood Pressure from Last 3 Encounters:   11/12/18 108/78   07/24/17 121/81   07/23/17 " 115/75    Weight from Last 3 Encounters:   11/12/18 130 lb (59 kg)   07/24/17 119 lb (54 kg)   07/23/17 119 lb 6.4 oz (54.2 kg)              We Performed the Following     HPV High Risk Types DNA Cervical     Pap imaged thin layer screen with HPV - recommended age 30 - 65          Where to get your medicines      These medications were sent to [facility] [address] - [place], [place] - [address] [facility]  [address] [facility] [address]     Phone:  [phone]     rizatriptan 10 MG ODT tab          Primary Care Provider Office Phone # Fax #    Kristen M Kehr, PA-C [phone]       [address] [facility] [address]        Equal Access to Services     LINDA CRANE : Hadii jose peoples hadasho Soomaali, waaxda luqadaha, qaybta kaalmada adeegyada, frederic liang . So [facility] [phone].    ATENCIÓN: Si habla español, tiene a allen disposición servicios gratuitos de asistencia lingüística. Llame al [phone].    We comply with applicable federal civil rights laws and [place] laws. We do not discriminate on the basis of race, color, national origin, age, disability, sex, sexual orientation, or gender identity.            Thank you!     Thank you for choosing [facility]  for your care. Our goal is always to provide you with excellent care. Hearing back from our patients is one way we can continue to improve our services. Please take a few minutes to complete the written survey that you may receive in the mail after your visit with us. Thank you!             Your Updated Medication List - Protect others around you: Learn how to safely use, store and throw away your medicines at www.disposemymeds.org.          This list is accurate as of 11/12/18  5:17 PM.  Always use your most recent med list.                   Brand Name Dispense Instructions for use Diagnosis    ferrous sulfate 325 (65 Fe) MG tablet    IRON    180 tablet    Take 1 tablet (325 mg) by mouth 2  times daily    Other iron deficiency anemia       ibuprofen 200 MG tablet   Generic drug:  ibuprofen      Take 200 mg by mouth every 4 hours as needed.        rizatriptan 10 MG ODT tab    MAXALT-MLT    18 tablet    Take 1 tablet (10 mg) by mouth at onset of headache for migraine May repeat in 2 hours. Max 3 tablets/24 hours.    Migraine without aura and without status migrainosus, not intractable       UNISOM PO      Take 1 tablet by mouth nightly as needed.

## 2018-11-12 NOTE — PROGRESS NOTES
SUBJECTIVE:   CC: Sarah Padilla is an 48 year old woman who presents for preventive health visit.     Physical   Annual:     Getting at least 3 servings of Calcium per day:  Yes    Bi-annual eye exam:  Yes    Dental care twice a year:  Yes    Sleep apnea or symptoms of sleep apnea:  None    Diet:  Carbohydrate counting and Gluten-free/reduced    Frequency of exercise:  4-5 days/week    Duration of exercise:  45-60 minutes    Taking medications regularly:  Yes    Medication side effects:  None    Additional concerns today:  Yes        Migraine Follow-Up    Headaches symptoms:  Stable     Frequency: 1-2  months     Duration of headaches:     Able to do normal daily activities/work with migraines: No -     Rescue/Relief medication:Maxalt              Effectiveness: total relief    Preventative medication:     Neurologic complications: No new stroke-like symptoms, loss of vision or speech, numbness or weakness    In the past 4 weeks, how often have you gone to Urgent Care or the emergency room because of your headaches?  0      Today's PHQ-2 Score:   PHQ-2 ( 1999 [employer]) 11/12/2018   Q1: Little interest or pleasure in doing things 0   Q2: Feeling down, depressed or hopeless 0   PHQ-2 Score 0   Q1: Little interest or pleasure in doing things Not at all   Q2: Feeling down, depressed or hopeless Not at all   PHQ-2 Score 0       Abuse: Current or Past(Physical, Sexual or Emotional)- No  Do you feel safe in your environment - Yes    Social History   Substance Use Topics     Smoking status: Never Smoker     Smokeless tobacco: Never Used     Alcohol use Yes      Comment: occasionally; socially     Alcohol Use 11/12/2018   If you drink alcohol do you typically have greater than 3 drinks per day OR greater than 7 drinks per week? No   No flowsheet data found.    Reviewed orders with patient.  Reviewed health maintenance and updated orders accordingly - Yes  BP Readings from Last 3 Encounters:   11/12/18 108/78   07/24/17  121/81   07/23/17 115/75    Wt Readings from Last 3 Encounters:   11/12/18 130 lb (59 kg)   07/24/17 119 lb (54 kg)   07/23/17 119 lb 6.4 oz (54.2 kg)                  Patient Active Problem List   Diagnosis     CARDIOVASCULAR SCREENING; LDL GOAL LESS THAN 160     Inflammatory arthritis     Other iron deficiency anemia     Migraine without aura and without status migrainosus, not intractable     Daily headache     Past Surgical History:   Procedure Laterality Date     No History of Surgery         Social History   Substance Use Topics     Smoking status: Never Smoker     Smokeless tobacco: Never Used     Alcohol use Yes      Comment: occasionally; socially     Family History   Problem Relation Age of Onset     Alzheimer Disease Father      Hypertension Father      Respiratory Maternal Grandmother      Emphysema     Cancer Maternal Grandfather      Lung/Skin         Current Outpatient Prescriptions   Medication Sig Dispense Refill     Doxylamine Succinate, Sleep, (UNISOM PO) Take 1 tablet by mouth nightly as needed.       ferrous sulfate (IRON) 325 (65 FE) MG tablet Take 1 tablet (325 mg) by mouth 2 times daily 180 tablet 3     ibuprofen (IBU-200) 200 MG tablet Take 200 mg by mouth every 4 hours as needed.       rizatriptan (MAXALT-MLT) 10 MG ODT tab Take 1 tablet (10 mg) by mouth at onset of headache for migraine May repeat in 2 hours. Max 3 tablets/24 hours. 18 tablet 3     No Known Allergies    Patient under age 50, mutual decision reflected in health maintenance.      Pertinent mammograms are reviewed under the imaging tab.  History of abnormal Pap smear:   NO - age 30-65 PAP every 5 years with negative HPV co-testing recommended  Last 3 Pap and HPV Results:   PAP / HPV 4/25/2016 3/29/2011   PAP NIL NIL     PAP / HPV 4/25/2016 3/29/2011   PAP NIL NIL     Reviewed and updated as needed this visit by clinical staff  Tobacco  Allergies  Meds  Med Hx  Surg Hx  Fam Hx  Soc Hx        Reviewed and updated as  "needed this visit by Provider        Past Medical History:   Diagnosis Date     Daily headache 10/2/2016     Daily headache      Inflammatory arthritis 4/4/2012     NO ACTIVE PROBLEMS       Past Surgical History:   Procedure Laterality Date     No History of Surgery         Review of Systems   Constitutional: Negative for chills and fever.   HENT: Negative for congestion, ear pain, hearing loss and sore throat.    Eyes: Negative for pain and visual disturbance.   Respiratory: Negative for cough and shortness of breath.    Cardiovascular: Negative for chest pain, palpitations and peripheral edema.   Gastrointestinal: Negative for abdominal pain, constipation, diarrhea, heartburn, hematochezia and nausea.   Breasts:  Negative for tenderness, breast mass and discharge.   Genitourinary: Negative for dysuria, frequency, genital sores, hematuria, pelvic pain, urgency, vaginal bleeding and vaginal discharge.   Musculoskeletal: Negative for arthralgias, joint swelling and myalgias.   Skin: Negative for rash.   Neurological: Negative for dizziness, weakness, headaches and paresthesias.   Psychiatric/Behavioral: Negative for mood changes. The patient is not nervous/anxious.           OBJECTIVE:   /78  Pulse 68  Temp 97.6  F (36.4  C) (Oral)  Resp 16  Ht 5' 3\" (1.6 m)  Wt 130 lb (59 kg)  LMP 11/01/2018  SpO2 100%  BMI 23.03 kg/m2  Physical Exam  GENERAL: healthy, alert and no distress  EYES: Eyes grossly normal to inspection, PERRL and conjunctivae and sclerae normal  HENT: ear canals and TM's normal, nose and mouth without ulcers or lesions  NECK: no adenopathy, no asymmetry, masses, or scars and thyroid normal to palpation  RESP: lungs clear to auscultation - no rales, rhonchi or wheezes  BREAST: normal without masses, tenderness or nipple discharge and no palpable axillary masses or adenopathy  CV: regular rate and rhythm, normal S1 S2, no S3 or S4, no murmur, click or rub, no peripheral edema and peripheral " "pulses strong  ABDOMEN: soft, nontender, no hepatosplenomegaly, no masses and bowel sounds normal   (female): normal female external genitalia, normal urethral meatus, vaginal mucosa pink, moist, well rugated, and normal cervix/adnexa/uterus without masses or discharge  MS: no gross musculoskeletal defects noted, no edema  SKIN: no suspicious lesions or rashes  NEURO: Normal strength and tone, mentation intact and speech normal  PSYCH: mentation appears normal, affect normal/bright    Diagnostic Test Results:  none     ASSESSMENT/PLAN:   1. Routine general medical examination at a health care facility  Health maintenance reviewed and updated.    2. Migraine without aura and without status migrainosus, not intractable  Stable, refills given  - rizatriptan (MAXALT-MLT) 10 MG ODT tab; Take 1 tablet (10 mg) by mouth at onset of headache for migraine May repeat in 2 hours. Max 3 tablets/24 hours.  Dispense: 18 tablet; Refill: 3    3. Screening for malignant neoplasm of cervix  - Pap imaged thin layer screen with HPV - recommended age 30 - 65  - HPV High Risk Types DNA Cervical    COUNSELING:  Reviewed preventive health counseling, as reflected in patient instructions       Regular exercise       Healthy diet/nutrition    BP Readings from Last 1 Encounters:   11/12/18 108/78     Estimated body mass index is 23.03 kg/(m^2) as calculated from the following:    Height as of this encounter: 5' 3\" (1.6 m).    Weight as of this encounter: 130 lb (59 kg).         reports that she has never smoked. She has never used smokeless tobacco.      Counseling Resources:  ATP IV Guidelines  Pooled Cohorts Equation Calculator  Breast Cancer Risk Calculator  FRAX Risk Assessment  ICSI Preventive Guidelines  Dietary Guidelines for Americans, 2010  [employer]'s MyPlate  ASA Prophylaxis  Lung CA Screening    Kristen M. Kehr, PA-C  [facility]  "

## 2018-11-12 NOTE — NURSING NOTE
"Chief Complaint   Patient presents with     Physical       Initial /78  Pulse 68  Temp 97.6  F (36.4  C) (Oral)  Resp 16  Ht 5' 3\" (1.6 m)  Wt 130 lb (59 kg)  LMP 11/01/2018  SpO2 100%  BMI 23.03 kg/m2 Estimated body mass index is 23.03 kg/(m^2) as calculated from the following:    Height as of this encounter: 5' 3\" (1.6 m).    Weight as of this encounter: 130 lb (59 kg).  Medication Reconciliation: complete    SAAD Fabian MA    "

## 2018-11-12 NOTE — LETTER
My Migraine Action Plan      Date: 11/12/2018     My Name: Sarah Padilla   YOB: 1970  My Pharmacy:    [id number] [place] [facility], [place] - [address]  [facility] - [place], [place] - [address] [facility]       My (Preventative) Control Medicine: none        My Rescue Medicine: maxalt   My Doctor: Kehr, Kristen M     My Clinic: [facility]  [id number] [place] [id number] [phone]        GREEN ZONE = Good Control    My headache plan is working.   I can do what I need to do.           I WILL:     ? Keep managing my triggers.  ? Write in my migraine diary each time I have a headache.  ? Keep taking my preventive (controller) medicine daily.  ? Take my relief and rescue medicine as needed.             YELLOW ZONE = Not Enough Control    My headache plan isn t always working.   My headaches keep me from doing   some of the things I need to do.       I WILL:     ? Set goals to control my triggers and act on them.  ? Write in my migraine diary each time I have a headache and review it for                      patterns or new triggers.  ? Keep taking my preventive (controller) medicine daily.  ? Take my relief and rescue medicine as needed.  ? Call my doctor or clinic at if I stay in the Yellow Zone.             RED ZONE = Poor or No Control    My headache plan has  failed. I can t do anything  when I have one. My  medicines aren t working.           I WILL:   ? Set goals to control my triggers and act on them.  ? Write in my migraine diary each time I have a headache and review it for                      patterns or new triggers.  ? Keep taking my preventive (controller) medicine daily.  ? Take my relief and rescue medicine as needed.  ? Call my doctor or clinic or go to urgent care or an ER if I m having the worst                  headache of my life.  ? Call my doctor or clinic or go to urgent care or an ER if my medicine doesn t work.  ? Let my doctor  or clinic know within 2 weeks if I have gone to an urgent care or             emergency department.          Provider specific instructions:

## 2018-11-14 LAB
COPATH REPORT: NORMAL
PAP: NORMAL

## 2018-11-18 LAB
FINAL DIAGNOSIS: NORMAL
HPV HR 12 DNA CVX QL NAA+PROBE: NEGATIVE
HPV16 DNA SPEC QL NAA+PROBE: NEGATIVE
HPV18 DNA SPEC QL NAA+PROBE: NEGATIVE
SPECIMEN DESCRIPTION: NORMAL
SPECIMEN SOURCE CVX/VAG CYTO: NORMAL

## 2019-12-02 ASSESSMENT — ENCOUNTER SYMPTOMS
PALPITATIONS: 0
FEVER: 0
JOINT SWELLING: 0
NAUSEA: 0
ARTHRALGIAS: 0
HEMATURIA: 0
FREQUENCY: 0
PARESTHESIAS: 0
EYE PAIN: 0
CONSTIPATION: 0
DIARRHEA: 0
WEAKNESS: 0
HEARTBURN: 0
HEADACHES: 1
DIZZINESS: 0
HEMATOCHEZIA: 0
MYALGIAS: 0
COUGH: 0
BREAST MASS: 0
NERVOUS/ANXIOUS: 0
SHORTNESS OF BREATH: 0
SORE THROAT: 0
ABDOMINAL PAIN: 0
CHILLS: 0
DYSURIA: 0

## 2019-12-04 ASSESSMENT — ENCOUNTER SYMPTOMS
HEARTBURN: 0
DIZZINESS: 0
DYSURIA: 0
BREAST MASS: 0
DIARRHEA: 0
COUGH: 0
PARESTHESIAS: 0
EYE PAIN: 0
FREQUENCY: 0
MYALGIAS: 0
PALPITATIONS: 0
CHILLS: 0
JOINT SWELLING: 0
NERVOUS/ANXIOUS: 0
WEAKNESS: 0
ARTHRALGIAS: 0
HEADACHES: 1
SORE THROAT: 0
NAUSEA: 0
ABDOMINAL PAIN: 0
HEMATOCHEZIA: 0
CONSTIPATION: 0
FEVER: 0
SHORTNESS OF BREATH: 0
HEMATURIA: 0

## 2019-12-04 NOTE — PROGRESS NOTES
SUBJECTIVE:   CC: Sarah Padilla is an 49 year old woman who presents for preventive health visit.     Healthy Habits:     Getting at least 3 servings of Calcium per day:  Yes    Bi-annual eye exam:  Yes    Dental care twice a year:  Yes    Sleep apnea or symptoms of sleep apnea:  None    Diet:  Regular (no restrictions)    Frequency of exercise:  4-5 days/week    Duration of exercise:  45-60 minutes    Taking medications regularly:  Yes    Medication side effects:  None    PHQ-2 Total Score: 0    Additional concerns today:  Yes        Today's PHQ-2 Score:   PHQ-2 ( 1999 Pf[employer]) 12/2/2019   Q1: Little interest or pleasure in doing things 0   Q2: Feeling down, depressed or hopeless 0   PHQ-2 Score 0   Q1: Little interest or pleasure in doing things Not at all   Q2: Feeling down, depressed or hopeless Not at all   PHQ-2 Score 0       Abuse: Current or Past(Physical, Sexual or Emotional)- No  Do you feel safe in your environment? Yes        Social History     Tobacco Use     Smoking status: Never Smoker     Smokeless tobacco: Never Used   Substance Use Topics     Alcohol use: Yes     Comment: occasionally; socially     If you drink alcohol do you typically have >3 drinks per day or >7 drinks per week? No    Alcohol Use 12/2/2019   Prescreen: >3 drinks/day or >7 drinks/week? No   Prescreen: >3 drinks/day or >7 drinks/week? -   No flowsheet data found.    Reviewed orders with patient.  Reviewed health maintenance and updated orders accordingly - Yes  BP Readings from Last 3 Encounters:   12/05/19 137/86   11/12/18 108/78   07/24/17 121/81    Wt Readings from Last 3 Encounters:   12/05/19 59 kg (130 lb)   11/12/18 59 kg (130 lb)   07/24/17 54 kg (119 lb)                  Patient Active Problem List   Diagnosis     CARDIOVASCULAR SCREENING; LDL GOAL LESS THAN 160     Inflammatory arthritis     Other iron deficiency anemia     Migraine without aura and without status migrainosus, not intractable     Daily headache     Past  Surgical History:   Procedure Laterality Date     No History of Surgery         Social History     Tobacco Use     Smoking status: Never Smoker     Smokeless tobacco: Never Used   Substance Use Topics     Alcohol use: Yes     Comment: occasionally; socially     Family History   Problem Relation Age of Onset     Alzheimer Disease Father      Hypertension Father      Respiratory Maternal Grandmother         Emphysema     Cancer Maternal Grandfather         Lung/Skin         Current Outpatient Medications   Medication Sig Dispense Refill     Doxylamine Succinate, Sleep, (UNISOM PO) Take 1 tablet by mouth nightly as needed.       ferrous sulfate (IRON) 325 (65 FE) MG tablet Take 1 tablet (325 mg) by mouth 2 times daily 180 tablet 3     ibuprofen (IBU-200) 200 MG tablet Take 200 mg by mouth every 4 hours as needed.       rizatriptan (MAXALT-MLT) 10 MG ODT Take 1 tablet (10 mg) by mouth at onset of headache for migraine May repeat in 2 hours. Max 3 tablets/24 hours. 12 tablet 11     No Known Allergies    Mammogram Screening: Patient under age 50, mutual decision reflected in health maintenance.      Pertinent mammograms are reviewed under the imaging tab.  History of abnormal Pap smear:   NO - age 30-65 PAP every 5 years with negative HPV co-testing recommended  Last 3 Pap and HPV Results:   PAP / HPV Latest Ref Rng & Units 11/12/2018 4/25/2016 3/29/2011   PAP - NIL NIL NIL   HPV 16 DNA NEG:Negative Negative - -   HPV 18 DNA NEG:Negative Negative - -   OTHER HR HPV NEG:Negative Negative - -     PAP / HPV Latest Ref Rng & Units 11/12/2018 4/25/2016 3/29/2011   PAP - NIL NIL NIL   HPV 16 DNA NEG:Negative Negative - -   HPV 18 DNA NEG:Negative Negative - -   OTHER HR HPV NEG:Negative Negative - -     Reviewed and updated as needed this visit by clinical staff  Tobacco  Allergies  Meds  Med Hx  Surg Hx  Fam Hx  Soc Hx        Reviewed and updated as needed this visit by Provider        Past Medical History:   Diagnosis  "Date     Daily headache 10/2/2016     Daily headache      Daily headache      Inflammatory arthritis 4/4/2012     NO ACTIVE PROBLEMS       Past Surgical History:   Procedure Laterality Date     No History of Surgery         Review of Systems   Constitutional: Negative for chills and fever.   HENT: Negative for congestion, ear pain, hearing loss and sore throat.    Eyes: Negative for pain and visual disturbance.   Respiratory: Negative for cough and shortness of breath.    Cardiovascular: Negative for chest pain, palpitations and peripheral edema.   Gastrointestinal: Negative for abdominal pain, constipation, diarrhea, heartburn, hematochezia and nausea.   Breasts:  Negative for tenderness, breast mass and discharge.   Genitourinary: Negative for dysuria, frequency, genital sores, hematuria, pelvic pain, urgency, vaginal bleeding and vaginal discharge.   Musculoskeletal: Negative for arthralgias, joint swelling and myalgias.   Skin: Negative for rash.   Neurological: Positive for headaches. Negative for dizziness, weakness and paresthesias.   Psychiatric/Behavioral: Negative for mood changes. The patient is not nervous/anxious.           OBJECTIVE:   /86   Pulse 68   Temp 98.3  F (36.8  C) (Oral)   Ht 1.588 m (5' 2.5\")   Wt 59 kg (130 lb)   LMP 11/14/2019   SpO2 100%   BMI 23.40 kg/m    Physical Exam  GENERAL: healthy, alert and no distress  EYES: Eyes grossly normal to inspection, PERRL and conjunctivae and sclerae normal  HENT: ear canals and TM's normal, nose and mouth without ulcers or lesions  NECK: no adenopathy, no asymmetry, masses, or scars and thyroid normal to palpation  RESP: lungs clear to auscultation - no rales, rhonchi or wheezes  BREAST: normal without masses, tenderness or nipple discharge and no palpable axillary masses or adenopathy  CV: regular rate and rhythm, normal S1 S2, no S3 or S4, no murmur, click or rub, no peripheral edema and peripheral pulses strong  ABDOMEN: soft, " "nontender, no hepatosplenomegaly, no masses and bowel sounds normal  MS: no gross musculoskeletal defects noted, no edema  Bunion right foot.   SKIN: no suspicious lesions or rashes  NEURO: Normal strength and tone, mentation intact and speech normal  PSYCH: mentation appears normal, affect normal/bright    Diagnostic Test Results:  Labs reviewed in Epic    ASSESSMENT/PLAN:   1. Routine general medical examination at a health care facility  Health maintenance reviewed and updated.  She declines mammogram.   Colon cancer screening next year.   Declines fasting labs, reviewed in Epic. Consider at 3-5 years.     2. Migraine without aura and without status migrainosus, not intractable  Refills given  - rizatriptan (MAXALT-MLT) 10 MG ODT; Take 1 tablet (10 mg) by mouth at onset of headache for migraine May repeat in 2 hours. Max 3 tablets/24 hours.  Dispense: 12 tablet; Refill: 11    3. Bunion, right  Referral for surgical consultation.  - PODIATRY/FOOT & ANKLE SURGERY REFERRAL    COUNSELING:  Reviewed preventive health counseling, as reflected in patient instructions       Regular exercise       Healthy diet/nutrition       Colon cancer screening    Estimated body mass index is 23.4 kg/m  as calculated from the following:    Height as of this encounter: 1.588 m (5' 2.5\").    Weight as of this encounter: 59 kg (130 lb).         reports that she has never smoked. She has never used smokeless tobacco.      Counseling Resources:  ATP IV Guidelines  Pooled Cohorts Equation Calculator  Breast Cancer Risk Calculator  FRAX Risk Assessment  ICSI Preventive Guidelines  Dietary Guidelines for Americans, 2010  USDA's MyPlate  ASA Prophylaxis  Lung CA Screening    Kristen M. Kehr, PA-C  [facility]  "

## 2019-12-05 ENCOUNTER — OFFICE VISIT (OUTPATIENT)
Dept: FAMILY MEDICINE | Facility: CLINIC | Age: 49
End: 2019-12-05
Payer: COMMERCIAL

## 2019-12-05 VITALS
BODY MASS INDEX: 23.04 KG/M2 | WEIGHT: 130 LBS | OXYGEN SATURATION: 100 % | HEART RATE: 68 BPM | HEIGHT: 63 IN | TEMPERATURE: 98.3 F | SYSTOLIC BLOOD PRESSURE: 137 MMHG | DIASTOLIC BLOOD PRESSURE: 86 MMHG

## 2019-12-05 DIAGNOSIS — G43.009 MIGRAINE WITHOUT AURA AND WITHOUT STATUS MIGRAINOSUS, NOT INTRACTABLE: ICD-10-CM

## 2019-12-05 DIAGNOSIS — Z00.00 ROUTINE GENERAL MEDICAL EXAMINATION AT A HEALTH CARE FACILITY: Primary | ICD-10-CM

## 2019-12-05 DIAGNOSIS — M21.611 BUNION, RIGHT: ICD-10-CM

## 2019-12-05 PROCEDURE — 99396 PREV VISIT EST AGE 40-64: CPT | Performed by: PHYSICIAN ASSISTANT

## 2019-12-05 PROCEDURE — 99212 OFFICE O/P EST SF 10 MIN: CPT | Mod: 25 | Performed by: PHYSICIAN ASSISTANT

## 2019-12-05 RX ORDER — RIZATRIPTAN BENZOATE 10 MG/1
10 TABLET, ORALLY DISINTEGRATING ORAL
Qty: 12 TABLET | Refills: 11 | Status: SHIPPED | OUTPATIENT
Start: 2019-12-05 | End: 2020-12-07

## 2019-12-05 ASSESSMENT — PAIN SCALES - GENERAL: PAINLEVEL: NO PAIN (0)

## 2019-12-05 ASSESSMENT — MIFFLIN-ST. JEOR: SCORE: 1175.87

## 2019-12-05 NOTE — NURSING NOTE
"Chief Complaint   Patient presents with     Physical       Initial /86   Pulse 68   Temp 98.3  F (36.8  C) (Oral)   Ht 1.588 m (5' 2.5\")   Wt 59 kg (130 lb)   LMP 11/14/2019   SpO2 100%   BMI 23.40 kg/m   Estimated body mass index is 23.4 kg/m  as calculated from the following:    Height as of this encounter: 1.588 m (5' 2.5\").    Weight as of this encounter: 59 kg (130 lb).  Medication Reconciliation: complete    SAAD Fabian MA    "

## 2019-12-17 ENCOUNTER — OFFICE VISIT (OUTPATIENT)
Dept: PODIATRY | Facility: CLINIC | Age: 49
End: 2019-12-17
Payer: COMMERCIAL

## 2019-12-17 ENCOUNTER — ANCILLARY PROCEDURE (OUTPATIENT)
Dept: GENERAL RADIOLOGY | Facility: CLINIC | Age: 49
End: 2019-12-17
Attending: PODIATRIST
Payer: COMMERCIAL

## 2019-12-17 VITALS
BODY MASS INDEX: 23.4 KG/M2 | HEART RATE: 76 BPM | WEIGHT: 130 LBS | DIASTOLIC BLOOD PRESSURE: 82 MMHG | SYSTOLIC BLOOD PRESSURE: 140 MMHG

## 2019-12-17 DIAGNOSIS — M21.619 BUNION: Primary | ICD-10-CM

## 2019-12-17 DIAGNOSIS — M21.619 BUNION: ICD-10-CM

## 2019-12-17 PROCEDURE — 99244 OFF/OP CNSLTJ NEW/EST MOD 40: CPT | Performed by: PODIATRIST

## 2019-12-17 PROCEDURE — 73630 X-RAY EXAM OF FOOT: CPT | Mod: RT

## 2019-12-17 NOTE — LETTER
12/17/2019         RE: Sarah Padilla  [address] [place] [facility] [address]        Dear Colleague,    Thank you for referring your patient, Sarah Padilla, to the [facility]. Please see a copy of my visit note below.    Subjective:    Pt is seen today in consult from Kristin Kehr with the chief complaint of right foot pain.  Points to medial bump of bunion and states that has been bothersome for years and it is made it harder to buy shoes.  In the past only intermittently painful.  But now for the last 6 month(s) has been bothering her daily..  Has tried shoegear changes with no improvement.  Bothersome both in joint and along medial aspect of 1st MPJ right foot.  Describes as burning pain.  Pain with ambulation and shoewear and difficulty working secondary to pain.  Positive family history of bunions and she states all of her female family members have this foot..  Has noticed deformity worsening over last several years.  Denies numbness.  Denies weakness.  Denies erythema.  The patient enjoys running.  She has never smoked.    ROS:  A 10-point review of systems was performed and is positive for that noted in the HPI and as seen below.  All other areas are negative.        No Known Allergies    Current Outpatient Medications   Medication Sig Dispense Refill     Doxylamine Succinate, Sleep, (UNISOM PO) Take 1 tablet by mouth nightly as needed.       ferrous sulfate (IRON) 325 (65 FE) MG tablet Take 1 tablet (325 mg) by mouth 2 times daily 180 tablet 3     ibuprofen (IBU-200) 200 MG tablet Take 200 mg by mouth every 4 hours as needed.       rizatriptan (MAXALT-MLT) 10 MG ODT Take 1 tablet (10 mg) by mouth at onset of headache for migraine May repeat in 2 hours. Max 3 tablets/24 hours. 12 tablet 11       Patient Active Problem List   Diagnosis     CARDIOVASCULAR SCREENING; LDL GOAL LESS THAN 160     Inflammatory arthritis     Other iron deficiency anemia     Migraine without aura  and without status migrainosus, not intractable     Daily headache     Bunion       Past Medical History:   Diagnosis Date     Daily headache 10/2/2016     Daily headache      Daily headache      Inflammatory arthritis 4/4/2012     NO ACTIVE PROBLEMS        Past Surgical History:   Procedure Laterality Date     No History of Surgery         Family History   Problem Relation Age of Onset     Alzheimer Disease Father      Hypertension Father      Respiratory Maternal Grandmother         Emphysema     Cancer Maternal Grandfather         Lung/Skin       Social History     Tobacco Use     Smoking status: Never Smoker     Smokeless tobacco: Never Used   Substance Use Topics     Alcohol use: Yes     Comment: occasionally; socially         Objective:    Vitals: BP (!) 140/82 (BP Location: Left arm)   Pulse 76   Wt 59 kg (130 lb)   BMI 23.40 kg/m     BMI: Body mass index is 23.4 kg/m .  Height: Data Unavailable    Constitutional/ general:  Pt is in no apparent distress, appears well-nourished.  Cooperative with history and physical exam.     Psych:  The patient answered questions appropriately.  Normal affect.  Seems to have reasonable expectations, in terms of treatment.    Eyes:  Visual scanning/ tracking without deficit.    Ears:  Response to auditory stimuli is normal.   negative hearing aid devices.  Auricles in proper alignment.     Lymphatic:  Popliteal lymph nodes not enlarged.     Lungs:  Non labored breathing, non labored speech. No cough.  No audible wheezing. Even, quiet breathing.      Vascular:  Pedal pulses are palpable bilaterally for both the DP and PT arteries.  CFT < 3 sec.  negative edema.  negative varicosities.  positive pedal hair growth noted.     Neuro:  Alert and oriented x 3. Coordinated gait.  Light touch sensation is intact to the L4, L5, S1 distributions. No obvious deficits.  No evidence of neurological-based weakness, spasticity, or contracture in the lower extremities.     Derm: Normal  texture and turgor.  No erythema, ecchymosis, or cyanosis.  No open lesions.     Musculoskeletal:    Lower extremity muscle strength is normal.  Patient is ambulatory without an assistive device or brace.   Mild reactive hyperemia over 1st MPJ of the right foot.  No underlying bursa noted.  Normal arch with weightbearing. Valgus rotation of hallux at MPJ. negative crepitus with range of motion of the first MPJ right foot.          Radiographic Exam:  Xrays, three views right foot weight bearing obtained today.  This demonstrated an intermetatarsal angle of 15 degrees.  Sesamoid position appears to be a 7.  First MPJ joint space deviated.  Significant metatarsus adductus.      ASSESSMENT:    Painful Hallux Valgus deformity right foot.    Plan:  A bunion is caused by a muscle imbalance. The big toe is pulled toward the smaller toes. The lump is created by a bone pushing outward.  We also discussed with the patient how her metatarsus adductus is contributing to the medial portion of her foot being prominent.  Bunion pain is usually a combination of shoes rubbing on the skin, nerve irritation, compression between the toes, joint misalignment, arthritis, and altered gait.   Most bunion pain can be improved by wearing compatible shoes. People with bunions cannot choose footwear just because they like the style. Your bunion should determine what shoe should be worn. Wide shoes with non-irritating seams, soft leather, and a square toe box are most compatible. Shoes should fit well out of the box but may need to be professionally stretched and modified to accommodate the bump. Heels, dress shoes, and pointed toes will not provide comfort.   Shoe inserts or orthotics will often times help with the bunion pain, however, inserts make a shoe fit more challenging. Pads placed around the bunion may help.   Bunion surgery involves cutting and repositioning the bones surrounding the bunion. Pins and screws are used to hold the bones  in place during the healing process. The goal of bunion surgery is to reduce the size of the bunion bump. Realignment of the toe and joint is attempted. Most first toes can not be forced back into a normal alignment even with surgery.   Healing after surgery requires about 6 weeks of protection. This allows the bone to heal. Maximum recovery takes about one year. The scar tissue and joint structures require this amount of time to finish the healing process. Expect stiffness, swelling, and numbness during that time frame.   Bunion surgery does involve side effects. Some side effects are predictable and others are less common but do occur. A scar will be visible and may be irritated by shoes. The shoe may rub on the screw or internal pin requiring surgical removal of the fixation devices. The screw and pin would like be in place for one year. The first toe may loose motion after surgery. The amount of stiffness is variable. Some people never regain motion of their big toe. This is due to scar tissue that develops with any surgery. The first toe may drift towards or away from the second toe. Spreading of the first and second toe is a rare occurrence after bunion surgery. This can be bothersome and may require surgical repair. Toe drift toward the second toe may result in a recurrent bunion and revision surgery. Joint fusion is one option to correct and unstable, drifting toe. This procedure straightens the toe, however, no motion remains. Fusion may be necessary to correct complications of bunion surgery or as the original procedure in severe cases.   All surgical procedures involve the risk of infection, numbness, pain, delayed bone healing, dislocation, blood clots, and continued foot pain. Bunion surgery is complex and should not be taken lightly.   Any skin incision can cause infection. Deep infection might involve the bone. If this occurs, repeat surgery and antibiotics through an IV for 6 weeks may be needed.  Scar tissue from dissection and retraction can cause nerve pain or numbness. This is generally temporary but may be permanent. We do not have treatments that cure nerve problems. Pain could develop in the second toe due to a change in pressure. In addition, the cut in the bone may displace and require additional surgery.   Delayed healing would lengthen the healing time. Some bones occasionally do not heal. If this occurs, repeat surgery, extended protection, or electronic bone stimulation may be needed. Smokers have a 20 % of the bone not healing; this is 2 times the risk of people who do not smoke.   Foot pain is complex. Most feet hurt for more than one reason. Fixing the bunion would not necessarily create a pain free foot. Appropriate shoes, healthy body weight, avoidance of bare foot walking, and moderation of activity will always be necessary to enjoy foot comfort. Your bunion may involve arthritis, or loss of the cartilage in the joint. This is incurable even with surgery. Also, long standing bunions often involve chronic irritation to the surrounding nerves. Nerve pain may not resolve even with reducing the bunion bump since permanent nerve damage may be present.   Bunion surgery is actually quite successful. Most surgical patients are pleased with their foot following bunion surgery. Many of the issues described above can be controlled by taking proper care of your foot during the healing process.   Discussed surgical as well as conservative options for her current foot pathology at length.  Patient has opted for surgical correction.  A right first metatarsal midshaft osteotomy bunionectomy will be done on an outpatient basis under MAC sedation.  Benefits and risks again discussed at length. These include, but are not limited to overcorrection, undercorrection, infection, numbness, scar formation, decreased ROM or painful ROM, loss of limb, chronic pain,need for further procedures.  Explained to patient  "they have metatarsus adductus.  Discussed the cause of this and how it affects foot function.   Because of this they will always have a wide forefoot and this makes completely reducing a bunion more difficult.  A \"Z\" type of foot may also result in some residual abducting of the hallux.  No guarantees of outcome were given. Patient gave verbal understanding.  Patient would have to be nonweightbearing for 6 weeks.  Should be in a removable cam walker for 3 weeks after that.  Will need pre-op history and physical within 7 days of the planned procedure. After care instructions, and the procedure were discussed at length.  We will set this up today Tuesday, December 31, 2019.  An order for surgery was written.  Patient will call me if she has any other questions.  Thank you for allowing me participate in the care of this patient.        Juan J Bustillos DPM DPM, FACFAS              Again, thank you for allowing me to participate in the care of your patient.        Sincerely,        Juan J Bustillos DPM    "

## 2019-12-17 NOTE — PATIENT INSTRUCTIONS
Weight management plan: Patient was referred to their PCP to discuss a diet and exercise plan.  We wish you continued good healing. If you have any questions or concerns, please do not hesitate to contact us at [phone]    Please remember to call and schedule a follow up appointment if one was recommended at your earliest convenience.   PODIATRY CLINIC HOURS  TELEPHONE NUMBER    Dr. Juan J Bustillos D.P.M [facility]    Clinics:  [facility]    [facility]   Tuesday 1PM-6PM  [place]  Wednesday 7AM-2PM  [facility]  Thursday 10AM-6PM  [place]  Friday 7AM-3PM  [place]  Specialty schedulers:   [phone] to make an appointment with any Specialty Provider.        Urgent Care locations:    [facility] Monday-Friday 5 pm - 9 pm. Saturday-Sunday 9 am -5pm    Monday-Friday 11 am - 9 pm Saturday 9 am - 5 pm     Monday-Sunday 12 noon-8PM [phone]     If you need a medication refill, please contact us you may need lab work and/or a follow up visit prior to your refill (i.e. Antifungal medications).    [facility] (secure e-mail communication and access to your chart) to send a message or to make an appointment.    If MRI needed please call [facility] [facility] at [phone]        Sarah to follow up with Primary Care provider regarding elevated blood pressure.

## 2019-12-18 ENCOUNTER — MYC MEDICAL ADVICE (OUTPATIENT)
Dept: PODIATRY | Facility: CLINIC | Age: 49
End: 2019-12-18

## 2019-12-19 ENCOUNTER — TELEPHONE (OUTPATIENT)
Dept: PODIATRY | Facility: CLINIC | Age: 49
End: 2019-12-19

## 2019-12-19 DIAGNOSIS — M21.619 BUNION: Primary | ICD-10-CM

## 2019-12-19 NOTE — TELEPHONE ENCOUNTER
Type of surgery: right bunion corrrection with  bone cutting and screw fixation  CPT 83197, 28448   Bunion M21.619  Location of surgery: [facility]  Date and time of surgery: 12-31-19  TBD  Surgeon: Dr Bustillos  Pre-Op Appt Date: 12-26-19  Post-Op Appt Date: 1-3-20    Packet sent out: Yes  Pre-cert/Authorization completed:  [facility] to [facility] and I spoke to [place], no prior auth needed, ref # [id number]  Date: 12/20/2019    Thank you,   Yvonne Candelaria   [facility] Department  [phone]

## 2019-12-19 NOTE — PROGRESS NOTES
Subjective:    Pt is seen today in consult from Kristin Kehr with the chief complaint of right foot pain.  Points to medial bump of bunion and states that has been bothersome for years and it is made it harder to buy shoes.  In the past only intermittently painful.  But now for the last 6 month(s) has been bothering her daily..  Has tried shoegear changes with no improvement.  Bothersome both in joint and along medial aspect of 1st MPJ right foot.  Describes as burning pain.  Pain with ambulation and shoewear and difficulty working secondary to pain.  Positive family history of bunions and she states all of her female family members have this foot..  Has noticed deformity worsening over last several years.  Denies numbness.  Denies weakness.  Denies erythema.  The patient enjoys running.  She has never smoked.    ROS:  A 10-point review of systems was performed and is positive for that noted in the HPI and as seen below.  All other areas are negative.        No Known Allergies    Current Outpatient Medications   Medication Sig Dispense Refill     Doxylamine Succinate, Sleep, (UNISOM PO) Take 1 tablet by mouth nightly as needed.       ferrous sulfate (IRON) 325 (65 FE) MG tablet Take 1 tablet (325 mg) by mouth 2 times daily 180 tablet 3     ibuprofen (IBU-200) 200 MG tablet Take 200 mg by mouth every 4 hours as needed.       rizatriptan (MAXALT-MLT) 10 MG ODT Take 1 tablet (10 mg) by mouth at onset of headache for migraine May repeat in 2 hours. Max 3 tablets/24 hours. 12 tablet 11       Patient Active Problem List   Diagnosis     CARDIOVASCULAR SCREENING; LDL GOAL LESS THAN 160     Inflammatory arthritis     Other iron deficiency anemia     Migraine without aura and without status migrainosus, not intractable     Daily headache     Bunion       Past Medical History:   Diagnosis Date     Daily headache 10/2/2016     Daily headache      Daily headache      Inflammatory arthritis 4/4/2012     NO ACTIVE PROBLEMS         Past Surgical History:   Procedure Laterality Date     No History of Surgery         Family History   Problem Relation Age of Onset     Alzheimer Disease Father      Hypertension Father      Respiratory Maternal Grandmother         Emphysema     Cancer Maternal Grandfather         Lung/Skin       Social History     Tobacco Use     Smoking status: Never Smoker     Smokeless tobacco: Never Used   Substance Use Topics     Alcohol use: Yes     Comment: occasionally; socially         Objective:    Vitals: BP (!) 140/82 (BP Location: Left arm)   Pulse 76   Wt 59 kg (130 lb)   BMI 23.40 kg/m    BMI: Body mass index is 23.4 kg/m .  Height: Data Unavailable    Constitutional/ general:  Pt is in no apparent distress, appears well-nourished.  Cooperative with history and physical exam.     Psych:  The patient answered questions appropriately.  Normal affect.  Seems to have reasonable expectations, in terms of treatment.    Eyes:  Visual scanning/ tracking without deficit.    Ears:  Response to auditory stimuli is normal.   negative hearing aid devices.  Auricles in proper alignment.     Lymphatic:  Popliteal lymph nodes not enlarged.     Lungs:  Non labored breathing, non labored speech. No cough.  No audible wheezing. Even, quiet breathing.      Vascular:  Pedal pulses are palpable bilaterally for both the DP and PT arteries.  CFT < 3 sec.  negative edema.  negative varicosities.  positive pedal hair growth noted.     Neuro:  Alert and oriented x 3. Coordinated gait.  Light touch sensation is intact to the L4, L5, S1 distributions. No obvious deficits.  No evidence of neurological-based weakness, spasticity, or contracture in the lower extremities.     Derm: Normal texture and turgor.  No erythema, ecchymosis, or cyanosis.  No open lesions.     Musculoskeletal:    Lower extremity muscle strength is normal.  Patient is ambulatory without an assistive device or brace.   Mild reactive hyperemia over 1st MPJ of the right  foot.  No underlying bursa noted.  Normal arch with weightbearing. Valgus rotation of hallux at MPJ. negative crepitus with range of motion of the first MPJ right foot.          Radiographic Exam:  Xrays, three views right foot weight bearing obtained today.  This demonstrated an intermetatarsal angle of 15 degrees.  Sesamoid position appears to be a 7.  First MPJ joint space deviated.  Significant metatarsus adductus.      ASSESSMENT:    Painful Hallux Valgus deformity right foot.    Plan:  A bunion is caused by a muscle imbalance. The big toe is pulled toward the smaller toes. The lump is created by a bone pushing outward.  We also discussed with the patient how her metatarsus adductus is contributing to the medial portion of her foot being prominent.  Bunion pain is usually a combination of shoes rubbing on the skin, nerve irritation, compression between the toes, joint misalignment, arthritis, and altered gait.   Most bunion pain can be improved by wearing compatible shoes. People with bunions cannot choose footwear just because they like the style. Your bunion should determine what shoe should be worn. Wide shoes with non-irritating seams, soft leather, and a square toe box are most compatible. Shoes should fit well out of the box but may need to be professionally stretched and modified to accommodate the bump. Heels, dress shoes, and pointed toes will not provide comfort.   Shoe inserts or orthotics will often times help with the bunion pain, however, inserts make a shoe fit more challenging. Pads placed around the bunion may help.   Bunion surgery involves cutting and repositioning the bones surrounding the bunion. Pins and screws are used to hold the bones in place during the healing process. The goal of bunion surgery is to reduce the size of the bunion bump. Realignment of the toe and joint is attempted. Most first toes can not be forced back into a normal alignment even with surgery.   Healing after  surgery requires about 6 weeks of protection. This allows the bone to heal. Maximum recovery takes about one year. The scar tissue and joint structures require this amount of time to finish the healing process. Expect stiffness, swelling, and numbness during that time frame.   Bunion surgery does involve side effects. Some side effects are predictable and others are less common but do occur. A scar will be visible and may be irritated by shoes. The shoe may rub on the screw or internal pin requiring surgical removal of the fixation devices. The screw and pin would like be in place for one year. The first toe may loose motion after surgery. The amount of stiffness is variable. Some people never regain motion of their big toe. This is due to scar tissue that develops with any surgery. The first toe may drift towards or away from the second toe. Spreading of the first and second toe is a rare occurrence after bunion surgery. This can be bothersome and may require surgical repair. Toe drift toward the second toe may result in a recurrent bunion and revision surgery. Joint fusion is one option to correct and unstable, drifting toe. This procedure straightens the toe, however, no motion remains. Fusion may be necessary to correct complications of bunion surgery or as the original procedure in severe cases.   All surgical procedures involve the risk of infection, numbness, pain, delayed bone healing, dislocation, blood clots, and continued foot pain. Bunion surgery is complex and should not be taken lightly.   Any skin incision can cause infection. Deep infection might involve the bone. If this occurs, repeat surgery and antibiotics through an IV for 6 weeks may be needed. Scar tissue from dissection and retraction can cause nerve pain or numbness. This is generally temporary but may be permanent. We do not have treatments that cure nerve problems. Pain could develop in the second toe due to a change in pressure. In  "addition, the cut in the bone may displace and require additional surgery.   Delayed healing would lengthen the healing time. Some bones occasionally do not heal. If this occurs, repeat surgery, extended protection, or electronic bone stimulation may be needed. Smokers have a 20 % of the bone not healing; this is 2 times the risk of people who do not smoke.   Foot pain is complex. Most feet hurt for more than one reason. Fixing the bunion would not necessarily create a pain free foot. Appropriate shoes, healthy body weight, avoidance of bare foot walking, and moderation of activity will always be necessary to enjoy foot comfort. Your bunion may involve arthritis, or loss of the cartilage in the joint. This is incurable even with surgery. Also, long standing bunions often involve chronic irritation to the surrounding nerves. Nerve pain may not resolve even with reducing the bunion bump since permanent nerve damage may be present.   Bunion surgery is actually quite successful. Most surgical patients are pleased with their foot following bunion surgery. Many of the issues described above can be controlled by taking proper care of your foot during the healing process.   Discussed surgical as well as conservative options for her current foot pathology at length.  Patient has opted for surgical correction.  A right first metatarsal midshaft osteotomy bunionectomy will be done on an outpatient basis under MAC sedation.  Benefits and risks again discussed at length. These include, but are not limited to overcorrection, undercorrection, infection, numbness, scar formation, decreased ROM or painful ROM, loss of limb, chronic pain,need for further procedures.  Explained to patient they have metatarsus adductus.  Discussed the cause of this and how it affects foot function.   Because of this they will always have a wide forefoot and this makes completely reducing a bunion more difficult.  A \"Z\" type of foot may also result in " some residual abducting of the hallux.  No guarantees of outcome were given. Patient gave verbal understanding.  Patient would have to be nonweightbearing for 6 weeks.  Should be in a removable cam walker for 3 weeks after that.  Will need pre-op history and physical within 7 days of the planned procedure. After care instructions, and the procedure were discussed at length.  We will set this up today Tuesday, December 31, 2019.  An order for surgery was written.  Patient will call me if she has any other questions.  Thank you for allowing me participate in the care of this patient.        Juan J Bustillos, ANTONIO DPM, FACFAS

## 2019-12-19 NOTE — TELEPHONE ENCOUNTER
Reason for Call:  Other call back    Detailed comments: patient wants to proceed with surgery and was told to contact Dr. Bustillos when she is ready to move forward.    I offered to transfer her to the surgery schedulers, but she wants to speak with Dr. Bustillos.    Phone Number Patient can be reached at: Cell number on file:    Telephone Information:   Mobile [phone]       Best Time: asap    Can we leave a detailed message on this number? YES    Call taken on 12/19/2019 at 9:26 AM by Karyn Alejo

## 2019-12-24 NOTE — PROGRESS NOTES
[facility]  [address] [address] [address] [phone]  Dept: [phone]    PRE-OP EVALUATION:  Today's date: 2019    Sarah Padilla (: 1970) presents for pre-operative evaluation assessment as requested by Dr. Bustillos.  She requires evaluation and anesthesia risk assessment prior to undergoing surgery/procedure for treatment of Bunion  .    Fax number for surgical facility:    Primary Physician: Kehr, Kristen M  Type of Anesthesia Anticipated: to be determined    Patient has a Health Care Directive or Living Will:  NO    Preop Questions 2019   Who is doing your surgery? Dr. Bustillos   What are you having done? Bunionectomy, right foot   Date of Surgery/Procedure:    Facility or Hospital where procedure/surgery will be performed: [facility]   1.  Do you have a history of Heart attack, stroke, stent, coronary bypass surgery, or other heart surgery? No   2.  Do you ever have any pain or discomfort in your chest? No   3.  Do you have a history of  Heart Failure? No   4.   Are you troubled by shortness of breath when:  walking on a level surface, or up a slight hill, or at night? No   5.  Do you currently have a cold, bronchitis or other respiratory infection? No   6.  Do you have a cough, shortness of breath, or wheezing? No   7.  Do you sometimes get pains in the calves of your legs when you walk? No   8. Do you or anyone in your family have previous history of blood clots? YES - father   9.  Do you or does anyone in your family have a serious bleeding problem such as prolonged bleeding following surgeries or cuts? No   10. Have you ever had problems with anemia or been told to take iron pills? YES - iron pills   11. Have you had any abnormal blood loss such as black, tarry or bloody stools, or abnormal vaginal bleeding? No   12. Have you ever had a blood transfusion? No   13. Have you or any of your relatives ever had problems with anesthesia? No   14.  Do you have sleep apnea, excessive snoring or daytime drowsiness? No   15. Do you have any prosthetic heart valves? No   16. Do you have prosthetic joints? No   17. Is there any chance that you may be pregnant? No         HPI:     HPI related to upcoming procedure: bunion      ANEMIA - Patient has a recent history of moderate-severe anemia, which has not been symptomatic. Work up to date has revealed  Is peninging cbc. Treatment has been successful.       MEDICAL HISTORY:     Patient Active Problem List    Diagnosis Date Noted     Bunion 12/19/2019     Priority: Medium     Added automatically from request for surgery [id number]       Migraine without aura and without status migrainosus, not intractable 10/02/2016     Priority: Medium     Daily headache 10/02/2016     Priority: Medium     Other iron deficiency anemia 04/06/2016     Priority: Medium     Inflammatory arthritis 04/04/2012     Priority: Medium     CARDIOVASCULAR SCREENING; LDL GOAL LESS THAN 160 02/14/2012     Priority: Medium      Past Medical History:   Diagnosis Date     Daily headache 10/2/2016     Daily headache      Daily headache      Inflammatory arthritis 4/4/2012     NO ACTIVE PROBLEMS      Past Surgical History:   Procedure Laterality Date     No History of Surgery       Current Outpatient Medications   Medication Sig Dispense Refill     Doxylamine Succinate, Sleep, (UNISOM PO) Take 1 tablet by mouth nightly as needed.       ferrous sulfate (IRON) 325 (65 FE) MG tablet Take 1 tablet (325 mg) by mouth 2 times daily 180 tablet 3     ibuprofen (IBU-200) 200 MG tablet Take 200 mg by mouth every 4 hours as needed.       rizatriptan (MAXALT-MLT) 10 MG ODT Take 1 tablet (10 mg) by mouth at onset of headache for migraine May repeat in 2 hours. Max 3 tablets/24 hours. 12 tablet 11     OTC products: None, except as noted above    No Known Allergies   Latex Allergy: NO    Social History     Tobacco Use     Smoking status: Never Smoker     Smokeless tobacco:  Never Used   Substance Use Topics     Alcohol use: Yes     Comment: occasionally; socially     History   Drug Use No       REVIEW OF SYSTEMS:   CONSTITUTIONAL: NEGATIVE for fever, chills, change in weight  INTEGUMENTARY/SKIN: NEGATIVE for worrisome rashes, moles or lesions  EYES: NEGATIVE for vision changes or irritation  ENT/MOUTH: NEGATIVE for ear, mouth and throat problems  RESP: NEGATIVE for significant cough or SOB  BREAST: NEGATIVE for masses, tenderness or discharge  CV: NEGATIVE for chest pain, palpitations or peripheral edema  GI: NEGATIVE for nausea, abdominal pain, heartburn, or change in bowel habits  : NEGATIVE for frequency, dysuria, or hematuria  MUSCULOSKELETAL: NEGATIVE for significant arthralgias or myalgia  NEURO: NEGATIVE for weakness, dizziness or paresthesias  ENDOCRINE: NEGATIVE for temperature intolerance, skin/hair changes  HEME: NEGATIVE for bleeding problems  PSYCHIATRIC: NEGATIVE for changes in mood or affect    EXAM:   /78   Pulse 74   Temp 98.2  F (36.8  C) (Oral)   Resp 14   Wt 59.6 kg (131 lb 6.4 oz)   LMP 12/12/2019   BMI 23.65 kg/m      GENERAL APPEARANCE: healthy, alert and no distress     EYES: EOMI,  PERRL     HENT: ear canals and TM's normal and nose and mouth without ulcers or lesions     NECK: no adenopathy, no asymmetry, masses, or scars and thyroid normal to palpation     RESP: lungs clear to auscultation - no rales, rhonchi or wheezes     CV: regular rates and rhythm, normal S1 S2, no S3 or S4 and no murmur, click or rub     ABDOMEN:  soft, nontender, no HSM or masses and bowel sounds normal     MS: extremities normal- no gross deformities noted, no evidence of inflammation in joints, FROM in all extremities.     SKIN: no suspicious lesions or rashes     NEURO: Normal strength and tone, sensory exam grossly normal, mentation intact and speech normal     PSYCH: mentation appears normal. and affect normal/bright     LYMPHATICS: No cervical  adenopathy    DIAGNOSTICS:   hemoglobin    Recent Labs   Lab Test 07/15/17  0957 04/17/16  0922  02/17/14  0945 03/02/12  1043   HGB 13.2 10.2*   < > 9.5*  --     330   < > 238  --    NA  --   --   --  145* 140   POTASSIUM  --   --   --  4.2 4.1   CR  --   --   --  0.77 0.86    < > = values in this interval not displayed.        IMPRESSION:   Reason for surgery/procedure: bunion  Diagnosis/reason for consult: Anesthesia     The proposed surgical procedure is considered LOW risk.    REVISED CARDIAC RISK INDEX  The patient has the following serious cardiovascular risks for perioperative complications such as (MI, PE, VFib and 3  AV Block):  No serious cardiac risks  INTERPRETATION: 0 risks: Class I (very low risk - 0.4% complication rate)    The patient has the following additional risks for perioperative complications:  No identified additional risks      ICD-10-CM    1. Preop general physical exam Z01.818    2. Bunion, right M21.611    3. Other iron deficiency anemia D50.8 CBC with platelets and differential       RECOMMENDATIONS:         --Patient is to take all scheduled medications on the day of surgery EXCEPT for modifications listed below.    APPROVAL GIVEN to proceed with proposed procedure, without further diagnostic evaluation       Signed Electronically by: Amrik Joshi MD    Copy of this evaluation report is provided to requesting physician.    [facility] Preop Guidelines    Revised Cardiac Risk Index

## 2019-12-26 ENCOUNTER — OFFICE VISIT (OUTPATIENT)
Dept: FAMILY MEDICINE | Facility: CLINIC | Age: 49
End: 2019-12-26
Payer: COMMERCIAL

## 2019-12-26 VITALS
HEART RATE: 74 BPM | RESPIRATION RATE: 14 BRPM | TEMPERATURE: 98.2 F | BODY MASS INDEX: 23.65 KG/M2 | SYSTOLIC BLOOD PRESSURE: 139 MMHG | WEIGHT: 131.4 LBS | DIASTOLIC BLOOD PRESSURE: 78 MMHG

## 2019-12-26 DIAGNOSIS — D50.8 OTHER IRON DEFICIENCY ANEMIA: ICD-10-CM

## 2019-12-26 DIAGNOSIS — Z01.818 PREOP GENERAL PHYSICAL EXAM: Primary | ICD-10-CM

## 2019-12-26 DIAGNOSIS — M21.611 BUNION, RIGHT: ICD-10-CM

## 2019-12-26 LAB
BASOPHILS # BLD AUTO: 0 10E9/L (ref 0–0.2)
BASOPHILS NFR BLD AUTO: 0.6 %
DIFFERENTIAL METHOD BLD: NORMAL
EOSINOPHIL # BLD AUTO: 0.1 10E9/L (ref 0–0.7)
EOSINOPHIL NFR BLD AUTO: 1.7 %
ERYTHROCYTE [DISTWIDTH] IN BLOOD BY AUTOMATED COUNT: 12.7 % (ref 10–15)
HCT VFR BLD AUTO: 39.8 % (ref 35–47)
HGB BLD-MCNC: 12.8 G/DL (ref 11.7–15.7)
LYMPHOCYTES # BLD AUTO: 1.3 10E9/L (ref 0.8–5.3)
LYMPHOCYTES NFR BLD AUTO: 28.4 %
MCH RBC QN AUTO: 31.7 PG (ref 26.5–33)
MCHC RBC AUTO-ENTMCNC: 32.2 G/DL (ref 31.5–36.5)
MCV RBC AUTO: 99 FL (ref 78–100)
MONOCYTES # BLD AUTO: 0.3 10E9/L (ref 0–1.3)
MONOCYTES NFR BLD AUTO: 6 %
NEUTROPHILS # BLD AUTO: 2.9 10E9/L (ref 1.6–8.3)
NEUTROPHILS NFR BLD AUTO: 63.3 %
PLATELET # BLD AUTO: 248 10E9/L (ref 150–450)
RBC # BLD AUTO: 4.04 10E12/L (ref 3.8–5.2)
WBC # BLD AUTO: 4.7 10E9/L (ref 4–11)

## 2019-12-26 PROCEDURE — 85025 COMPLETE CBC W/AUTO DIFF WBC: CPT | Performed by: FAMILY MEDICINE

## 2019-12-26 PROCEDURE — 36415 COLL VENOUS BLD VENIPUNCTURE: CPT | Performed by: FAMILY MEDICINE

## 2019-12-26 PROCEDURE — 99214 OFFICE O/P EST MOD 30 MIN: CPT | Performed by: FAMILY MEDICINE

## 2019-12-26 NOTE — NURSING NOTE
"Chief Complaint   Patient presents with     Pre-Op Exam       Initial /78   Pulse 74   Temp 98.2  F (36.8  C) (Oral)   Resp 14   Wt 59.6 kg (131 lb 6.4 oz)   LMP 12/12/2019   BMI 23.65 kg/m   Estimated body mass index is 23.65 kg/m  as calculated from the following:    Height as of 12/5/19: 1.588 m (5' 2.5\").    Weight as of this encounter: 59.6 kg (131 lb 6.4 oz).  Medication Reconciliation: complete  Fritz Berg CMA    "

## 2019-12-30 ENCOUNTER — ANESTHESIA EVENT (OUTPATIENT)
Dept: SURGERY | Facility: AMBULATORY SURGERY CENTER | Age: 49
End: 2019-12-30

## 2019-12-31 ENCOUNTER — ANCILLARY PROCEDURE (OUTPATIENT)
Dept: GENERAL RADIOLOGY | Facility: CLINIC | Age: 49
End: 2019-12-31
Attending: PODIATRIST
Payer: COMMERCIAL

## 2019-12-31 ENCOUNTER — HOSPITAL ENCOUNTER (OUTPATIENT)
Facility: AMBULATORY SURGERY CENTER | Age: 49
Discharge: HOME | End: 2019-12-31
Attending: PODIATRIST | Admitting: PODIATRIST
Payer: COMMERCIAL

## 2019-12-31 ENCOUNTER — ANESTHESIA (OUTPATIENT)
Dept: SURGERY | Facility: AMBULATORY SURGERY CENTER | Age: 49
End: 2019-12-31

## 2019-12-31 ENCOUNTER — TELEPHONE (OUTPATIENT)
Dept: PODIATRY | Facility: CLINIC | Age: 49
End: 2019-12-31

## 2019-12-31 VITALS
HEART RATE: 88 BPM | DIASTOLIC BLOOD PRESSURE: 45 MMHG | SYSTOLIC BLOOD PRESSURE: 110 MMHG | TEMPERATURE: 97.4 F | OXYGEN SATURATION: 99 % | RESPIRATION RATE: 12 BRPM

## 2019-12-31 DIAGNOSIS — M79.673 PAIN OF FOOT, UNSPECIFIED LATERALITY: ICD-10-CM

## 2019-12-31 DIAGNOSIS — M21.619 BUNION: ICD-10-CM

## 2019-12-31 LAB — HCG UR QL: NEGATIVE

## 2019-12-31 PROCEDURE — G8907 PT DOC NO EVENTS ON DISCHARG: HCPCS

## 2019-12-31 PROCEDURE — 81025 URINE PREGNANCY TEST: CPT | Performed by: ANESTHESIOLOGY

## 2019-12-31 PROCEDURE — 28296 COR HLX VLGS DSTL MTAR OSTEO: CPT | Mod: T5

## 2019-12-31 PROCEDURE — G8916 PT W IV AB GIVEN ON TIME: HCPCS

## 2019-12-31 PROCEDURE — 28296 COR HLX VLGS DSTL MTAR OSTEO: CPT | Mod: RT | Performed by: PODIATRIST

## 2019-12-31 DEVICE — IMP SCR SYN CORTEX 2.0X14MM SELF TAP SS 201.814: Type: IMPLANTABLE DEVICE | Site: FOOT | Status: FUNCTIONAL

## 2019-12-31 RX ORDER — ONDANSETRON 2 MG/ML
4 INJECTION INTRAMUSCULAR; INTRAVENOUS EVERY 30 MIN PRN
Status: DISCONTINUED | OUTPATIENT
Start: 2019-12-31 | End: 2020-01-01 | Stop reason: HOSPADM

## 2019-12-31 RX ORDER — FENTANYL CITRATE 50 UG/ML
INJECTION, SOLUTION INTRAMUSCULAR; INTRAVENOUS PRN
Status: DISCONTINUED | OUTPATIENT
Start: 2019-12-31 | End: 2019-12-31

## 2019-12-31 RX ORDER — ACETAMINOPHEN 325 MG/1
975 TABLET ORAL ONCE
Status: COMPLETED | OUTPATIENT
Start: 2019-12-31 | End: 2019-12-31

## 2019-12-31 RX ORDER — ONDANSETRON 4 MG/1
4 TABLET, ORALLY DISINTEGRATING ORAL EVERY 30 MIN PRN
Status: DISCONTINUED | OUTPATIENT
Start: 2019-12-31 | End: 2020-01-01 | Stop reason: HOSPADM

## 2019-12-31 RX ORDER — CEFAZOLIN SODIUM 2 G/100ML
2 INJECTION, SOLUTION INTRAVENOUS
Status: COMPLETED | OUTPATIENT
Start: 2019-12-31 | End: 2019-12-31

## 2019-12-31 RX ORDER — FENTANYL CITRATE 50 UG/ML
25-50 INJECTION, SOLUTION INTRAMUSCULAR; INTRAVENOUS
Status: DISCONTINUED | OUTPATIENT
Start: 2019-12-31 | End: 2020-01-01 | Stop reason: HOSPADM

## 2019-12-31 RX ORDER — LIDOCAINE 40 MG/G
CREAM TOPICAL
Status: DISCONTINUED | OUTPATIENT
Start: 2019-12-31 | End: 2020-01-01 | Stop reason: HOSPADM

## 2019-12-31 RX ORDER — OXYCODONE HYDROCHLORIDE 5 MG/1
5-10 TABLET ORAL EVERY 4 HOURS PRN
Status: DISCONTINUED | OUTPATIENT
Start: 2019-12-31 | End: 2020-01-01 | Stop reason: HOSPADM

## 2019-12-31 RX ORDER — KETOROLAC TROMETHAMINE 30 MG/ML
INJECTION, SOLUTION INTRAMUSCULAR; INTRAVENOUS PRN
Status: DISCONTINUED | OUTPATIENT
Start: 2019-12-31 | End: 2019-12-31

## 2019-12-31 RX ORDER — PROPOFOL 10 MG/ML
INJECTION, EMULSION INTRAVENOUS PRN
Status: DISCONTINUED | OUTPATIENT
Start: 2019-12-31 | End: 2019-12-31

## 2019-12-31 RX ORDER — LIDOCAINE HYDROCHLORIDE 20 MG/ML
INJECTION, SOLUTION INFILTRATION; PERINEURAL PRN
Status: DISCONTINUED | OUTPATIENT
Start: 2019-12-31 | End: 2019-12-31

## 2019-12-31 RX ORDER — KETOROLAC TROMETHAMINE 30 MG/ML
30 INJECTION, SOLUTION INTRAMUSCULAR; INTRAVENOUS EVERY 6 HOURS PRN
Status: DISCONTINUED | OUTPATIENT
Start: 2019-12-31 | End: 2020-01-01 | Stop reason: HOSPADM

## 2019-12-31 RX ORDER — HYDROXYZINE HYDROCHLORIDE 25 MG/1
25-50 TABLET, FILM COATED ORAL EVERY 4 HOURS PRN
Qty: 30 TABLET | Refills: 0 | Status: SHIPPED | OUTPATIENT
Start: 2019-12-31 | End: 2020-02-11

## 2019-12-31 RX ORDER — NALOXONE HYDROCHLORIDE 0.4 MG/ML
.1-.4 INJECTION, SOLUTION INTRAMUSCULAR; INTRAVENOUS; SUBCUTANEOUS
Status: DISCONTINUED | OUTPATIENT
Start: 2019-12-31 | End: 2020-01-01 | Stop reason: HOSPADM

## 2019-12-31 RX ORDER — ONDANSETRON 2 MG/ML
INJECTION INTRAMUSCULAR; INTRAVENOUS PRN
Status: DISCONTINUED | OUTPATIENT
Start: 2019-12-31 | End: 2019-12-31

## 2019-12-31 RX ORDER — PROPOFOL 10 MG/ML
INJECTION, EMULSION INTRAVENOUS CONTINUOUS PRN
Status: DISCONTINUED | OUTPATIENT
Start: 2019-12-31 | End: 2019-12-31

## 2019-12-31 RX ORDER — BUPIVACAINE HYDROCHLORIDE 5 MG/ML
INJECTION, SOLUTION PERINEURAL PRN
Status: DISCONTINUED | OUTPATIENT
Start: 2019-12-31 | End: 2019-12-31 | Stop reason: HOSPADM

## 2019-12-31 RX ORDER — ALBUTEROL SULFATE 0.83 MG/ML
2.5 SOLUTION RESPIRATORY (INHALATION) EVERY 4 HOURS PRN
Status: DISCONTINUED | OUTPATIENT
Start: 2019-12-31 | End: 2020-01-01 | Stop reason: HOSPADM

## 2019-12-31 RX ORDER — HYDROCODONE BITARTRATE AND ACETAMINOPHEN 5; 325 MG/1; MG/1
1-2 TABLET ORAL EVERY 4 HOURS PRN
Qty: 30 TABLET | Refills: 0 | Status: SHIPPED | OUTPATIENT
Start: 2019-12-31 | End: 2020-02-11

## 2019-12-31 RX ORDER — SODIUM CHLORIDE, SODIUM LACTATE, POTASSIUM CHLORIDE, CALCIUM CHLORIDE 600; 310; 30; 20 MG/100ML; MG/100ML; MG/100ML; MG/100ML
INJECTION, SOLUTION INTRAVENOUS CONTINUOUS
Status: DISCONTINUED | OUTPATIENT
Start: 2019-12-31 | End: 2020-01-01 | Stop reason: HOSPADM

## 2019-12-31 RX ORDER — GABAPENTIN 300 MG/1
300 CAPSULE ORAL ONCE
Status: COMPLETED | OUTPATIENT
Start: 2019-12-31 | End: 2019-12-31

## 2019-12-31 RX ORDER — DEXAMETHASONE SODIUM PHOSPHATE 4 MG/ML
INJECTION, SOLUTION INTRA-ARTICULAR; INTRALESIONAL; INTRAMUSCULAR; INTRAVENOUS; SOFT TISSUE PRN
Status: DISCONTINUED | OUTPATIENT
Start: 2019-12-31 | End: 2019-12-31

## 2019-12-31 RX ORDER — DEXAMETHASONE SODIUM PHOSPHATE 4 MG/ML
4 INJECTION, SOLUTION INTRA-ARTICULAR; INTRALESIONAL; INTRAMUSCULAR; INTRAVENOUS; SOFT TISSUE EVERY 10 MIN PRN
Status: DISCONTINUED | OUTPATIENT
Start: 2019-12-31 | End: 2020-01-01 | Stop reason: HOSPADM

## 2019-12-31 RX ORDER — MEPERIDINE HYDROCHLORIDE 25 MG/ML
12.5 INJECTION INTRAMUSCULAR; INTRAVENOUS; SUBCUTANEOUS
Status: DISCONTINUED | OUTPATIENT
Start: 2019-12-31 | End: 2020-01-01 | Stop reason: HOSPADM

## 2019-12-31 RX ORDER — CEFAZOLIN SODIUM 1 G/3ML
1 INJECTION, POWDER, FOR SOLUTION INTRAMUSCULAR; INTRAVENOUS SEE ADMIN INSTRUCTIONS
Status: DISCONTINUED | OUTPATIENT
Start: 2019-12-31 | End: 2020-01-01 | Stop reason: HOSPADM

## 2019-12-31 RX ORDER — HYDROMORPHONE HYDROCHLORIDE 1 MG/ML
.3-.5 INJECTION, SOLUTION INTRAMUSCULAR; INTRAVENOUS; SUBCUTANEOUS EVERY 10 MIN PRN
Status: DISCONTINUED | OUTPATIENT
Start: 2019-12-31 | End: 2020-01-01 | Stop reason: HOSPADM

## 2019-12-31 RX ADMIN — KETOROLAC TROMETHAMINE 30 MG: 30 INJECTION, SOLUTION INTRAMUSCULAR; INTRAVENOUS at 10:38

## 2019-12-31 RX ADMIN — CEFAZOLIN SODIUM 2 G: 2 INJECTION, SOLUTION INTRAVENOUS at 09:19

## 2019-12-31 RX ADMIN — PROPOFOL 150 MCG/KG/MIN: 10 INJECTION, EMULSION INTRAVENOUS at 09:16

## 2019-12-31 RX ADMIN — Medication 50 MCG: at 10:02

## 2019-12-31 RX ADMIN — SODIUM CHLORIDE, SODIUM LACTATE, POTASSIUM CHLORIDE, CALCIUM CHLORIDE: 600; 310; 30; 20 INJECTION, SOLUTION INTRAVENOUS at 08:21

## 2019-12-31 RX ADMIN — LIDOCAINE HYDROCHLORIDE 40 MG: 20 INJECTION, SOLUTION INFILTRATION; PERINEURAL at 09:16

## 2019-12-31 RX ADMIN — Medication 50 MCG: at 10:12

## 2019-12-31 RX ADMIN — PROPOFOL 30 MG: 10 INJECTION, EMULSION INTRAVENOUS at 09:16

## 2019-12-31 RX ADMIN — DEXAMETHASONE SODIUM PHOSPHATE 4 MG: 4 INJECTION, SOLUTION INTRA-ARTICULAR; INTRALESIONAL; INTRAMUSCULAR; INTRAVENOUS; SOFT TISSUE at 10:03

## 2019-12-31 RX ADMIN — ONDANSETRON 4 MG: 2 INJECTION INTRAMUSCULAR; INTRAVENOUS at 10:03

## 2019-12-31 RX ADMIN — FENTANYL CITRATE 50 MCG: 50 INJECTION, SOLUTION INTRAMUSCULAR; INTRAVENOUS at 09:10

## 2019-12-31 RX ADMIN — GABAPENTIN 300 MG: 300 CAPSULE ORAL at 08:19

## 2019-12-31 RX ADMIN — ACETAMINOPHEN 975 MG: 325 TABLET ORAL at 08:19

## 2019-12-31 RX ADMIN — FENTANYL CITRATE 50 MCG: 50 INJECTION, SOLUTION INTRAMUSCULAR; INTRAVENOUS at 09:16

## 2019-12-31 NOTE — TELEPHONE ENCOUNTER
LMTC(left mess to call)   Lee[facility]  Albina Lyle [facility]

## 2019-12-31 NOTE — TELEPHONE ENCOUNTER
Insurance will not cover the Rx for her Knee walker unless it is signed by rigo EDMONDS or DO   informed      Albina Lyle CMA

## 2019-12-31 NOTE — ANESTHESIA PREPROCEDURE EVALUATION
Anesthesia Pre-Procedure Evaluation    Patient: Sarah Padilla   MRN:     [id number] Gender:   female   Age:    49 year old :      1970        Preoperative Diagnosis: Bunion [M21.619]   Procedure(s):  BUNIONECTOMY Right     Past Medical History:   Diagnosis Date     Daily headache 10/2/2016     Daily headache      Daily headache      Inflammatory arthritis 2012     NO ACTIVE PROBLEMS       Past Surgical History:   Procedure Laterality Date     No History of Surgery            Anesthesia Evaluation     .             ROS/MED HX    ENT/Pulmonary:  - neg pulmonary ROS     Neurologic:  - neg neurologic ROS   (+)migraines,     Cardiovascular:  - neg cardiovascular ROS       METS/Exercise Tolerance:     Hematologic:  - neg hematologic  ROS   (+) Anemia, -      Musculoskeletal:  - neg musculoskeletal ROS       GI/Hepatic:  - neg GI/hepatic ROS       Renal/Genitourinary:  - ROS Renal section negative       Endo:  - neg endo ROS       Psychiatric:  - neg psychiatric ROS       Infectious Disease:  - neg infectious disease ROS       Malignancy:      - no malignancy   Other:    - neg other ROS                     PHYSICAL EXAM:   Mental Status/Neuro: A/A/O   Airway: Facies: Feasible  Mallampati: I  Mouth/Opening: Full  TM distance: > 6 cm  Neck ROM: Full   Respiratory: Auscultation: CTAB     Resp. Rate: Normal     Resp. Effort: Normal      CV: Rhythm: Regular  Rate: Age appropriate  Heart: Normal Sounds  Edema: None   Comments:      Dental: Normal Dentition                LABS:  CBC:   Lab Results   Component Value Date    WBC 4.7 2019    WBC 3.6 (L) 07/15/2017    HGB 12.8 2019    HGB 13.2 07/15/2017    HCT 39.8 2019    HCT 40.5 07/15/2017     2019     07/15/2017     BMP:   Lab Results   Component Value Date     (H) 2014     2012    POTASSIUM 4.2 2014    POTASSIUM 4.1 2012    CHLORIDE 107 2014    CHLORIDE 104 2012    CO2 24 2014  "   CO2 25 03/02/2012    BUN 10 02/17/2014    BUN 11 03/02/2012    CR 0.77 02/17/2014    CR 0.86 03/02/2012    GLC 85 07/15/2017    GLC 84 04/17/2016     COAGS: No results found for: PTT, INR, FIBR  POC:   Lab Results   Component Value Date    HCG Negative 12/31/2019     OTHER:   Lab Results   Component Value Date    MAT 8.8 02/17/2014    ALBUMIN 4.0 03/02/2012    PROTTOTAL 7.4 03/02/2012    ALT 23 03/02/2012    AST 37 03/02/2012    ALKPHOS 101 03/02/2012    BILITOTAL 0.4 03/02/2012    TSH 1.90 04/25/2016    CRP <5.0 02/14/2012    SED 38 (H) 02/14/2012        Preop Vitals    BP Readings from Last 3 Encounters:   12/31/19 131/72   12/26/19 139/78   12/17/19 (!) 140/82    Pulse Readings from Last 3 Encounters:   12/31/19 75   12/26/19 74   12/17/19 76      Resp Readings from Last 3 Encounters:   12/31/19 16   12/26/19 14   11/12/18 16    SpO2 Readings from Last 3 Encounters:   12/31/19 100%   12/05/19 100%   11/12/18 100%      Temp Readings from Last 1 Encounters:   12/31/19 36.4  C (97.5  F) (Temporal)    Ht Readings from Last 1 Encounters:   12/05/19 1.588 m (5' 2.5\")      Wt Readings from Last 1 Encounters:   12/26/19 59.6 kg (131 lb 6.4 oz)    Estimated body mass index is 23.65 kg/m  as calculated from the following:    Height as of 12/5/19: 1.588 m (5' 2.5\").    Weight as of 12/26/19: 59.6 kg (131 lb 6.4 oz).     LDA:  Peripheral IV 12/31/19 Left Hand (Active)   Site Assessment WDL 12/31/2019  8:14 AM   Line Status Infusing 12/31/2019  8:14 AM   Phlebitis Scale 0-->no symptoms 12/31/2019  8:14 AM   Number of days: 0        Assessment:   ASA SCORE: 1    H&P: History and physical reviewed and following examination; no interval change.   Smoking Status:  Non-Smoker/Unknown   NPO Status: NPO Appropriate     Plan:   Anes. Type:  MAC   Pre-Medication: None   Induction:  N/a   Airway: Native Airway   Access/Monitoring: PIV   Maintenance: Propofol Sedation     Postop Plan:   Postop Pain: None  Postop Sedation/Airway: Not " planned  Disposition: Outpatient     PONV Management:   Adult Risk Factors: Female, Non-Smoker   Prevention: Ondansetron, Propofol     CONSENT: Direct conversation   Plan and risks discussed with: Patient   Blood Products: Consent Deferred (Minimal Blood Loss)                   Jaime Rubio MD

## 2019-12-31 NOTE — ANESTHESIA POSTPROCEDURE EVALUATION
Anesthesia POST Procedure Evaluation    Patient: Sarah Padilla   MRN:     [id number] Gender:   female   Age:    49 year old :      1970        Preoperative Diagnosis: Bunion [M21.619]   Procedure(s):  BUNIONECTOMY Right   Postop Comments: No value filed.       Anesthesia Type:  Not documented  MAC    Reportable Event: NO     PAIN: Uncomplicated   Sign Out status: Comfortable, Well controlled pain     PONV: No PONV   Sign Out status:  No Nausea or Vomiting     Neuro/Psych: Uneventful perioperative course   Sign Out Status: Preoperative baseline; Age appropriate mentation     Airway/Resp.: Uneventful perioperative course   Sign Out Status: Non labored breathing, age appropriate RR; Resp. Status within EXPECTED Parameters     CV: Uneventful perioperative course   Sign Out status: Appropriate BP and perfusion indices; Appropriate HR/Rhythm     Disposition:   Sign Out in:  PACU  Disposition:  Phase II; Home  Recovery Course: Uneventful  Follow-Up: Not required           Last Anesthesia Record Vitals:  CRNA VITALS  2019 1015 - 2019 1115      2019             Pulse:  78    SpO2:  94 %          Last PACU Vitals:  Vitals Value Taken Time   BP 93/60 2019 10:46 AM   Temp 36.8  C (98.3  F) 2019 10:46 AM   Pulse 74 2019 10:46 AM   Resp 16 2019 10:46 AM   SpO2 97 % 2019 10:46 AM   Temp src     NIBP 94/69 2019 10:40 AM   Pulse 78 2019 10:46 AM   SpO2 94 % 2019 10:46 AM   Resp     Temp 36.9  C (98.4  F) 2019 10:39 AM   Ht Rate     Temp 2           Electronically Signed By: Jaime Rubio MD, 2019, 12:00 PM

## 2019-12-31 NOTE — DISCHARGE INSTRUCTIONS
[facility]   Adult Discharge Orders & Instructions     For 24 hours after surgery    1. Get plenty of rest.  A responsible adult must stay with you for at least 24 hours after you leave the hospital.   2. Do not drive or use heavy equipment.  If you have weakness or tingling, don't drive or use heavy equipment until this feeling goes away.  3. Do not drink alcohol.  4. Avoid strenuous or risky activities.  Ask for help when climbing stairs.   5. You may feel lightheaded.  IF so, sit for a few minutes before standing.  Have someone help you get up.   6. If you have nausea (feel sick to your stomach): Drink only clear liquids such as apple juice, ginger ale, broth or 7-Up.  Rest may also help.  Be sure to drink enough fluids.  Move to a regular diet as you feel able.  7. You may have a slight fever. Call the doctor if your fever is over 100 F (37.7 C) (taken under the tongue) or lasts longer than 24 hours.  8. You may have a dry mouth, a sore throat, muscle aches or trouble sleeping.  These should go away after 24 hours.  9. Do not make important or legal decisions.   Call your doctor for any of the followin.  Signs of infection (fever, growing tenderness at the surgery site, a large amount of drainage or bleeding, severe pain, foul-smelling drainage, redness, swelling).    2. It has been over 8 to 10 hours since surgery and you are still not able to urinate (pass water).    3.  Headache for over 24 hours.    To contact Dr Bustillos call:  [phone]    You last had Tylenol at 815 am

## 2019-12-31 NOTE — TELEPHONE ENCOUNTER
Dianne from [facility] calling back stating they would need updated Rx from a DO or MD for knee walker. Please fax to [phone].  [phone].

## 2019-12-31 NOTE — OP NOTE
Procedure Date: 12/31/2019      SURGEON:  Juan J Bustillos DPM      PREOPERATIVE DIAGNOSIS:  Right bunion.      POSTOPERATIVE DIAGNOSIS:  Right bunion.      PROCEDURE:  Right Javon bunionectomy.      ANESTHESIA:  MAC.      HEMOSTASIS:  Pneumatic ankle tourniquet at 250 mmHg.      INDICATIONS:  The patient has a painful bunion deformity and elects to have surgical correction.  She understands the possible complications include numbness, infection, continued pain.      DESCRIPTION OF PROCEDURE:  The patient was brought to the operating table, laid in supine position.  She was given sedation by the anesthesiologist, and a right first Johnson block was done.  The foot was then prepped and draped in normal sterile manner.  Pneumatic ankle tourniquet was placed around the ankle with copious amounts of Webril padding.  The foot was then elevated for complete exsanguination.  The tourniquet inflated.  The foot was brought on the operating table where the following procedure was performed.  At this time, an incision was made just medial to the extensor hallucis longus tendon.  This was brought down to the deep fascia utilizing blunt and sharp dissection techniques.  All neurovascular structures that were encountered were either Bovie tied or retracted to the side.  We incised the periosteum the entire length of the incision, reflected it off medially and dorsally.  First interspace release was done to our satisfaction.  Removed the medial bump.  We did a standard long-arm Javon bunionectomy with the axis guide in neutral position.  After this was cut, we transposed this laterally as far as we could.  We temporarily fixated this.  Good reduction of the deformity was noted.  We fixed this with two 2-0  screws from dorsal to plantar, utilizing standard AO fixation techniques.  After this was done, removed the bump.  All sharp bony prominences were burred smooth.  We tightened the screws.  We loaded the foot.  Good reduction of the  deformity was noted.  This was confirmed with Fluoroscan.  Standard layered closure was done at this time.  We dressed this with Adaptic, 4 x 4's, Joy, Kerlix and Coban.  Tourniquet was deflated.  All digits were noted to show preoperative levels of perfusion.      COMPLICATIONS:  None.      ESTIMATED BLOOD LOSS:  1 mL.      The patient tolerated the procedure and anesthesia well.  She was then wheeled from the operating room to the recovery room with vital signs stable and intact sterile dressing.         SILVIA MTZ DPM             D: 2019   T: 2019   MT: SHAVON      Name:     RANDY MILLER   MRN:      [id number]        Account:        [id number]   :      1970           Procedure Date: 2019      Document: [id number]

## 2019-12-31 NOTE — TELEPHONE ENCOUNTER
Reason for Call:  Other prescription    Detailed comments: [facility] [employer], [phone] called regarding prescription for Knee Walker.      [employer] is requesting an MD or DO signature.    Please call to advise.    [phone] ok to leave message.    Please fax to [phone], Attn  Leeanna    Best Time: anuytime    Can we leave a detailed message on this number? YES         Call taken on 12/31/2019 at 9:47 AM by Josefina Aguayo

## 2019-12-31 NOTE — BRIEF OP NOTE
[facility] Brief Operative Note    Pre-operative diagnosis: Bunion [M21.619]   Post-operative diagnosis same   Procedure: Procedure(s):  BUNIONECTOMY Right   Surgeon(s): Surgeon(s) and Role:     * Juan J Bustillos DPM - Primary   Estimated blood loss: 1 mL    Specimens: none   Findings: none

## 2019-12-31 NOTE — ANESTHESIA CARE TRANSFER NOTE
Patient: Sarah Padilla    Procedure(s):  BUNIONECTOMY Right    Diagnosis: Bunion [M21.619]  Diagnosis Additional Information: No value filed.    Anesthesia Type:   MAC     Note:  Airway :Room Air  Patient transferred to:Phase II  Comments: Awake, comfortable, sats 99%, Report to RN.Handoff Report: Identifed the Patient, Identified the Reponsible Provider, Reviewed the pertinent medical history, Discussed the surgical course, Reviewed Intra-OP anesthesia mangement and issues during anesthesia, Set expectations for post-procedure period and Allowed opportunity for questions and acknowledgement of understanding      Vitals: (Last set prior to Anesthesia Care Transfer)    CRNA VITALS  12/31/2019 1015 - 12/31/2019 1048      12/31/2019             Pulse:  78    SpO2:  94 %                Electronically Signed By: JIN Vivar CRNA  December 31, 2019  10:48 AM

## 2020-01-03 ENCOUNTER — OFFICE VISIT (OUTPATIENT)
Dept: PODIATRY | Facility: CLINIC | Age: 50
End: 2020-01-03
Payer: COMMERCIAL

## 2020-01-03 VITALS
BODY MASS INDEX: 23.92 KG/M2 | DIASTOLIC BLOOD PRESSURE: 70 MMHG | SYSTOLIC BLOOD PRESSURE: 122 MMHG | HEIGHT: 62 IN | HEART RATE: 88 BPM | WEIGHT: 130 LBS

## 2020-01-03 DIAGNOSIS — M21.619 BUNION: Primary | ICD-10-CM

## 2020-01-03 PROCEDURE — 99024 POSTOP FOLLOW-UP VISIT: CPT | Performed by: PODIATRIST

## 2020-01-03 ASSESSMENT — MIFFLIN-ST. JEOR: SCORE: 1167.93

## 2020-01-03 NOTE — PATIENT INSTRUCTIONS
Weight management plan: Patient was referred to their PCP to discuss a diet and exercise plan.  We wish you continued good healing. If you have any questions or concerns, please do not hesitate to contact us at [phone]    Please remember to call and schedule a follow up appointment if one was recommended at your earliest convenience.   PODIATRY CLINIC HOURS  TELEPHONE NUMBER    Dr. Juan J Bustillos D.P.M [facility]    Clinics:  [facility]    Albina Lyle [facility]   Tuesday 1PM-6PM  [place]  Wednesday 7AM-2PM  [facility]  Thursday 10AM-6PM  [place]  Friday 7AM-3PM  [place]  Specialty schedulers:   [phone] to make an appointment with any Specialty Provider.        Urgent Care locations:    [facility] Monday-Friday 5 pm - 9 pm. Saturday-Sunday 9 am -5pm    Monday-Friday 11 am - 9 pm Saturday 9 am - 5 pm     Monday-Sunday 12 noon-8PM [phone]     If you need a medication refill, please contact us you may need lab work and/or a follow up visit prior to your refill (i.e. Antifungal medications).    [facility] (secure e-mail communication and access to your chart) to send a message or to make an appointment.    If MRI needed please call [place] Eckert at [phone]

## 2020-01-03 NOTE — PROGRESS NOTES
Subjective:    Patient is 3 days postopp right Javon bunionectomy procedure done on 12/31/19.  Patient is doing well, admits compliance, denies fevers, chills, nausea or vomiting.  Pain slowly getting better.  She has only been taking Tylenol for pain.  She has been nonweightbearing and using crutches.  She has no other new complaints.    Objective:    Dressings clean, dry and intact.  Incision dry, well coapted with no dehiscence or drainage.  Pulses are palpable, sensation to light touch is intact.  Normal postopp edema and ecchymosis.  No erythema on the opperative site.   Hallux in good alignment.      Assessment: Postopp day 3 right Javon bunionectomy.    Plan:  New dressing placed on foot.  Continue ACE bandage and elevation.  Start gentle range of motion of first MTPJ and we instructed her on how to do this..  Any increase in erythema, edema, and pain patient to call me immediately.   We gave her a plantarflexed Cam walker today to help keep this offloaded.  Will wear cam walker while ambulating and will use crutches..  She will take this off when not walking and do ankle ROM.  RETURN TO CLINIC in 11 days for suture removal and xray.    Juan J Bustillos, ANTONIO DPM, FACFAS

## 2020-01-03 NOTE — LETTER
1/3/2020         RE: Sarah Padilla  [address] [facility] [address]        Dear Colleague,    Thank you for referring your patient, Sarah Padilla, to the [facility]. Please see a copy of my visit note below.    Subjective:    Patient is 3 days postopp right Javon bunionectomy procedure done on 12/31/19.  Patient is doing well, admits compliance, denies fevers, chills, nausea or vomiting.  Pain slowly getting better.  She has only been taking Tylenol for pain.  She has been nonweightbearing and using crutches.  She has no other new complaints.    Objective:    Dressings clean, dry and intact.  Incision dry, well coapted with no dehiscence or drainage.  Pulses are palpable, sensation to light touch is intact.  Normal postopp edema and ecchymosis.  No erythema on the opperative site.   Hallux in good alignment.      Assessment: Postopp day 3 right Javon bunionectomy.    Plan:  New dressing placed on foot.  Continue ACE bandage and elevation.  Start gentle range of motion of first MTPJ and we instructed her on how to do this..  Any increase in erythema, edema, and pain patient to call me immediately.   We gave her a plantarflexed Cam walker today to help keep this offloaded.  Will wear cam walker while ambulating and will use crutches..  She will take this off when not walking and do ankle ROM.  RETURN TO CLINIC in 11 days for suture removal and xray.    Juan J Bustillos DPM DPM, FACFAS        Again, thank you for allowing me to participate in the care of your patient.        Sincerely,        Juan J Bustillos DPM

## 2020-01-14 ENCOUNTER — ANCILLARY PROCEDURE (OUTPATIENT)
Dept: GENERAL RADIOLOGY | Facility: CLINIC | Age: 50
End: 2020-01-14
Attending: PODIATRIST
Payer: COMMERCIAL

## 2020-01-14 ENCOUNTER — OFFICE VISIT (OUTPATIENT)
Dept: PODIATRY | Facility: CLINIC | Age: 50
End: 2020-01-14
Payer: COMMERCIAL

## 2020-01-14 DIAGNOSIS — M21.619 BUNION: ICD-10-CM

## 2020-01-14 DIAGNOSIS — M21.619 BUNION: Primary | ICD-10-CM

## 2020-01-14 PROCEDURE — 99024 POSTOP FOLLOW-UP VISIT: CPT | Performed by: PODIATRIST

## 2020-01-14 PROCEDURE — 73630 X-RAY EXAM OF FOOT: CPT | Mod: RT

## 2020-01-14 NOTE — PROGRESS NOTES
Subjective:    Patient is 14 days postopp right Javon bunionectomy procedure done on 12/31/19.  Patient is doing well, admits compliance, denies fevers, chills, nausea or vomiting.  Pain slowly getting better.  She has only been taking Tylenol for pain.  She has been walking and plantarflex CAM Walker now.   she has no other new complaints.    Objective:    Dressings clean, dry and intact.  Incision dry, well coapted with no dehiscence or drainage with suture removal.  Pulses are palpable, sensation to light touch is intact.  Normal postopp edema and ecchymosis and this is decreased from last visit.  No erythema on the opperative site.   Hallux in good alignment.     X-rays reveal that the osteotomy is tight and the hardware is in place.  No change noted.  Sesamoids in good position.    Assessment: Postopp day 14 right Javon bunionectomy.    Plan: X-rays taken of right foot today.  We remove the suture.  She may get this wet in a shower now but she will not soak in a bathtub.  She will protect this with a Band-Aid.  Patient will continue range of motion of first MTPJ and we instructed her on how to do this..  Any increase in erythema, edema, and pain patient to call me immediately.   She will continue walking in the plantarflexed Cam walker.  We gave her the 2-week warning.  She will call if she would like physical therapy for range of motion.  RETURN TO CLINIC in 4 weeks.    Juan J Bustillos, ANTONIO ALVAREZ, FACFAS

## 2020-01-14 NOTE — LETTER
1/14/2020         RE: Sarah Padilla  [address] [facility] [address]        Dear Colleague,    Thank you for referring your patient, Sarah Padilla, to the [facility]. Please see a copy of my visit note below.    Subjective:    Patient is 14 days postopp right Javon bunionectomy procedure done on 12/31/19.  Patient is doing well, admits compliance, denies fevers, chills, nausea or vomiting.  Pain slowly getting better.  She has only been taking Tylenol for pain.  She has been walking and plantarflex CAM Walker now.   she has no other new complaints.    Objective:    Dressings clean, dry and intact.  Incision dry, well coapted with no dehiscence or drainage with suture removal.  Pulses are palpable, sensation to light touch is intact.  Normal postopp edema and ecchymosis and this is decreased from last visit.  No erythema on the opperative site.   Hallux in good alignment.     X-rays reveal that the osteotomy is tight and the hardware is in place.  No change noted.  Sesamoids in good position.    Assessment: Postopp day 14 right Javon bunionectomy.    Plan: X-rays taken of right foot today.  We remove the suture.  She may get this wet in a shower now but she will not soak in a bathtub.  She will protect this with a Band-Aid.  Patient will continue range of motion of first MTPJ and we instructed her on how to do this..  Any increase in erythema, edema, and pain patient to call me immediately.   She will continue walking in the plantarflexed Cam walker.  We gave her the 2-week warning.  She will call if she would like physical therapy for range of motion.  RETURN TO CLINIC in 4 weeks.    Juan J Bustillos DPM DPM, FACFAS        Again, thank you for allowing me to participate in the care of your patient.        Sincerely,        Juan J Bustillos DPM

## 2020-01-23 ENCOUNTER — MYC MEDICAL ADVICE (OUTPATIENT)
Dept: PODIATRY | Facility: CLINIC | Age: 50
End: 2020-01-23

## 2020-02-11 ENCOUNTER — ANCILLARY PROCEDURE (OUTPATIENT)
Dept: GENERAL RADIOLOGY | Facility: CLINIC | Age: 50
End: 2020-02-11
Attending: PODIATRIST
Payer: COMMERCIAL

## 2020-02-11 ENCOUNTER — OFFICE VISIT (OUTPATIENT)
Dept: PODIATRY | Facility: CLINIC | Age: 50
End: 2020-02-11
Payer: COMMERCIAL

## 2020-02-11 VITALS — WEIGHT: 130 LBS | BODY MASS INDEX: 23.78 KG/M2

## 2020-02-11 DIAGNOSIS — M21.619 BUNION: Primary | ICD-10-CM

## 2020-02-11 DIAGNOSIS — M21.619 BUNION: ICD-10-CM

## 2020-02-11 PROCEDURE — 99207 ZZC NO CHARGE LOS: CPT | Performed by: PODIATRIST

## 2020-02-11 PROCEDURE — 73630 X-RAY EXAM OF FOOT: CPT | Mod: RT

## 2020-02-11 NOTE — LETTER
2/11/2020         RE: Sarah Padilla  [address] [facility] [address]        Dear Colleague,    Thank you for referring your patient, Sarah Padilla, to the [facility]. Please see a copy of my visit note below.    Subjective:    Patient is 6 weeks postopp right Javon bunionectomy procedure done on 12/31/19.  Patient is doing well, admits compliance, denies fevers, chills, nausea or vomiting.  Edema is decreasing.  She has no pain.  She has been walking in CAM Walker.   she has no other new complaints.    Objective:    Incision is well-healed.  Foot warm to touch.  Pulses are palpable, sensation to light touch is intact.  Normal postopp edema and this is decreased from last visit.  No erythema or ecchymosis on the opperative site.   Hallux in good alignment.  Range of motion is about 70% normal    X-rays reveal that the osteotomy is tight and the hardware is in place.  No change noted.  Sesamoids in good position.    Assessment: Postopp  right Javon bunionectomy.    Plan: X-rays taken of right foot today.  Discussed with patient that there is been no change in the osteotomy and the hardware is intact.  She will continue range of motion of the first MTPJ and get more aggressive.  We instructed her on how to do this.  We discussed physical therapy for this.  She declines at this time but she will call if she would like this in the future.  She may now start walking without the Cam walker.    Patient will start tomorrow walking in good supportive shoe.  If there is no pain or edema then will slowly increase time in shoe 1-2 hours per day until walking all day in good shoe.  If patient has pain or edema back in cam walker for 5 days and then will try again.  No running, jogging, or high impact activities until walking all day with no pain for 2 weeks.  We discussed that at 3 months after surgery she can start doing higher impact activities.  RTC PRN      Juan J Bustillos DPM DPM,  SYED        Again, thank you for allowing me to participate in the care of your patient.        Sincerely,        Juan J Bustillos DPM

## 2020-02-11 NOTE — PROGRESS NOTES
Subjective:    Patient is 6 weeks postopp right Jaovn bunionectomy procedure done on 12/31/19.  Patient is doing well, admits compliance, denies fevers, chills, nausea or vomiting.  Edema is decreasing.  She has no pain.  She has been walking in CAM Walker.   she has no other new complaints.    Objective:    Incision is well-healed.  Foot warm to touch.  Pulses are palpable, sensation to light touch is intact.  Normal postopp edema and this is decreased from last visit.  No erythema or ecchymosis on the opperative site.   Hallux in good alignment.  Range of motion is about 70% normal    X-rays reveal that the osteotomy is tight and the hardware is in place.  No change noted.  Sesamoids in good position.    Assessment: Postopp  right Javon bunionectomy.    Plan: X-rays taken of right foot today.  Discussed with patient that there is been no change in the osteotomy and the hardware is intact.  She will continue range of motion of the first MTPJ and get more aggressive.  We instructed her on how to do this.  We discussed physical therapy for this.  She declines at this time but she will call if she would like this in the future.  She may now start walking without the Cam walker.    Patient will start tomorrow walking in good supportive shoe.  If there is no pain or edema then will slowly increase time in shoe 1-2 hours per day until walking all day in good shoe.  If patient has pain or edema back in cam walker for 5 days and then will try again.  No running, jogging, or high impact activities until walking all day with no pain for 2 weeks.  We discussed that at 3 months after surgery she can start doing higher impact activities.  RTC PRN      Juan J Bustillos, DPFUAD DPM, FACFAS

## 2020-02-11 NOTE — PATIENT INSTRUCTIONS
We wish you continued good healing. If you have any questions or concerns, please do not hesitate to contact us at [phone]    Please remember to call and schedule a follow up appointment if one was recommended at your earliest convenience.   PODIATRY CLINIC HOURS  TELEPHONE NUMBER    Dr. Juan J Bustillos D.P.[facility]    Clinics:  [facility]    [facility]   Tuesday 1PM-6PM  [place]  Wednesday 7AM-2PM  [facility]  Thursday 10AM-6PM  [place]  Friday 7AM-3PM  [place]  Specialty schedulers:   [phone] to make an appointment with any Specialty Provider.        Urgent Care locations:    [facility] Monday-Friday 5 pm - 9 pm. Saturday-Sunday 9 am -5pm    Monday-Friday 11 am - 9 pm Saturday 9 am - 5 pm     Monday-Sunday 12 noon-8PM [phone]     If you need a medication refill, please contact us you may need lab work and/or a follow up visit prior to your refill (i.e. Antifungal medications).    [facility] (secure e-mail communication and access to your chart) to send a message or to make an appointment.    If MRI needed please call [facility] at [phone]

## 2020-11-04 ENCOUNTER — ANCILLARY PROCEDURE (OUTPATIENT)
Dept: MAMMOGRAPHY | Facility: CLINIC | Age: 50
End: 2020-11-04
Payer: COMMERCIAL

## 2020-11-04 DIAGNOSIS — Z12.31 VISIT FOR SCREENING MAMMOGRAM: ICD-10-CM

## 2020-11-04 PROCEDURE — 77067 SCR MAMMO BI INCL CAD: CPT | Performed by: RADIOLOGY

## 2020-11-22 ENCOUNTER — HEALTH MAINTENANCE LETTER (OUTPATIENT)
Age: 50
End: 2020-11-22

## 2020-12-07 ENCOUNTER — OFFICE VISIT (OUTPATIENT)
Dept: FAMILY MEDICINE | Facility: CLINIC | Age: 50
End: 2020-12-07
Payer: COMMERCIAL

## 2020-12-07 VITALS
DIASTOLIC BLOOD PRESSURE: 84 MMHG | WEIGHT: 120 LBS | OXYGEN SATURATION: 100 % | BODY MASS INDEX: 21.26 KG/M2 | SYSTOLIC BLOOD PRESSURE: 120 MMHG | TEMPERATURE: 96.5 F | HEIGHT: 63 IN | HEART RATE: 68 BPM

## 2020-12-07 DIAGNOSIS — Z00.00 ROUTINE GENERAL MEDICAL EXAMINATION AT A HEALTH CARE FACILITY: Primary | ICD-10-CM

## 2020-12-07 DIAGNOSIS — G43.009 MIGRAINE WITHOUT AURA AND WITHOUT STATUS MIGRAINOSUS, NOT INTRACTABLE: ICD-10-CM

## 2020-12-07 DIAGNOSIS — N93.8 DUB (DYSFUNCTIONAL UTERINE BLEEDING): ICD-10-CM

## 2020-12-07 DIAGNOSIS — Z12.11 SCREEN FOR COLON CANCER: ICD-10-CM

## 2020-12-07 PROCEDURE — 99396 PREV VISIT EST AGE 40-64: CPT | Performed by: PHYSICIAN ASSISTANT

## 2020-12-07 PROCEDURE — 99213 OFFICE O/P EST LOW 20 MIN: CPT | Mod: 25 | Performed by: PHYSICIAN ASSISTANT

## 2020-12-07 RX ORDER — RIZATRIPTAN BENZOATE 10 MG/1
10 TABLET, ORALLY DISINTEGRATING ORAL
Qty: 12 TABLET | Refills: 11 | Status: SHIPPED | OUTPATIENT
Start: 2020-12-07 | End: 2021-12-15

## 2020-12-07 ASSESSMENT — ENCOUNTER SYMPTOMS
FREQUENCY: 0
ABDOMINAL PAIN: 0
MYALGIAS: 0
BREAST MASS: 0
PARESTHESIAS: 0
HEMATURIA: 0
NERVOUS/ANXIOUS: 0
HEARTBURN: 0
NAUSEA: 0
HEADACHES: 0
JOINT SWELLING: 0
HEMATOCHEZIA: 0
SHORTNESS OF BREATH: 0
DIARRHEA: 0
FEVER: 0
CONSTIPATION: 0
ARTHRALGIAS: 0
COUGH: 0
PALPITATIONS: 0
DYSURIA: 0
WEAKNESS: 0
DIZZINESS: 0
SORE THROAT: 0
EYE PAIN: 0
CHILLS: 0

## 2020-12-07 ASSESSMENT — PAIN SCALES - GENERAL: PAINLEVEL: NO PAIN (0)

## 2020-12-07 ASSESSMENT — MIFFLIN-ST. JEOR: SCORE: 1133.45

## 2020-12-07 NOTE — PATIENT INSTRUCTIONS
Contact [facility] [phone] to schedule appointment for the pelvic ultrasound.                   Preventive Health Recommendations  Female Ages 50 - 64    Yearly exam: See your health care provider every year in order to  o Review health changes.   o Discuss preventive care.    o Review your medicines if your doctor has prescribed any.      Get a Pap test every three years (unless you have an abnormal result and your provider advises testing more often).    If you get Pap tests with HPV test, you only need to test every 5 years, unless you have an abnormal result.     You do not need a Pap test if your uterus was removed (hysterectomy) and you have not had cancer.    You should be tested each year for STDs (sexually transmitted diseases) if you're at risk.     Have a mammogram every 1 to 2 years.    Have a colonoscopy at age 50, or have a yearly FIT test (stool test). These exams screen for colon cancer.      Have a cholesterol test every 5 years, or more often if advised.    Have a diabetes test (fasting glucose) every three years. If you are at risk for diabetes, you should have this test more often.     If you are at risk for osteoporosis (brittle bone disease), think about having a bone density scan (DEXA).    Shots: Get a flu shot each year. Get a tetanus shot every 10 years.    Nutrition:     Eat at least 5 servings of fruits and vegetables each day.    Eat whole-grain bread, whole-wheat pasta and brown rice instead of white grains and rice.    Get adequate Calcium and Vitamin D.     Lifestyle    Exercise at least 150 minutes a week (30 minutes a day, 5 days a week). This will help you control your weight and prevent disease.    Limit alcohol to one drink per day.    No smoking.     Wear sunscreen to prevent skin cancer.     See your dentist every six months for an exam and cleaning.    See your eye doctor every 1 to 2 years.

## 2020-12-07 NOTE — NURSING NOTE
"Chief Complaint   Patient presents with     Physical       Initial /84   Pulse 68   Temp 96.5  F (35.8  C) (Tympanic)   Ht 1.6 m (5' 3\")   Wt 54.4 kg (120 lb)   LMP 11/23/2020   SpO2 100%   BMI 21.26 kg/m   Estimated body mass index is 21.26 kg/m  as calculated from the following:    Height as of this encounter: 1.6 m (5' 3\").    Weight as of this encounter: 54.4 kg (120 lb).  Medication Reconciliation: complete    SAAD Fabian MA    "

## 2020-12-12 ENCOUNTER — TRANSFERRED RECORDS (OUTPATIENT)
Dept: HEALTH INFORMATION MANAGEMENT | Facility: CLINIC | Age: 50
End: 2020-12-12

## 2020-12-12 LAB — COLOGUARD-ABSTRACT: NEGATIVE

## 2020-12-21 ENCOUNTER — TELEPHONE (OUTPATIENT)
Dept: FAMILY MEDICINE | Facility: CLINIC | Age: 50
End: 2020-12-21

## 2020-12-21 NOTE — LETTER
December 21, 2020      Sarah Padilla  [address] [address]      Dear Sarah,    The result of your recent Cologuard testing was negative. A negative result means that Cologuard did not detect significant levels of DNA and/or hemoglobin biomarkers in the stool which are associated with colon cancer or precancer.    Thank you for completing your screening, your next screening should be completed in 3 years.      If you have any questions or concerns, please contact your care team at [phone].     Sincerely,  Kristen Kehr, PA-C  / [email or web address]

## 2020-12-21 NOTE — TELEPHONE ENCOUNTER
Negative Cologuard results received, Placed in providers basket.  Letter sent to patient, faxed to stat scanning.  MASHA Dejesus

## 2021-01-04 ENCOUNTER — ANCILLARY PROCEDURE (OUTPATIENT)
Dept: ULTRASOUND IMAGING | Facility: CLINIC | Age: 51
End: 2021-01-04
Attending: PHYSICIAN ASSISTANT
Payer: COMMERCIAL

## 2021-01-04 DIAGNOSIS — N93.8 DUB (DYSFUNCTIONAL UTERINE BLEEDING): ICD-10-CM

## 2021-01-05 ENCOUNTER — TELEPHONE (OUTPATIENT)
Dept: FAMILY MEDICINE | Facility: CLINIC | Age: 51
End: 2021-01-05

## 2021-01-05 NOTE — TELEPHONE ENCOUNTER
:  Please contact this patient.   She was scheduled for IUD placement tomorrow, but central scheduling put it in a 20 minute appointment time. Please have her come early if possible.   Kristen Kehr PA-C

## 2021-01-05 NOTE — TELEPHONE ENCOUNTER
Called the patient @ [phone]. I let the patient know that not enough time was allotted for her request for tomorrow's appt.. She understood and patient will arrive at 1:30 per K. Kehr's request.    Elyse Mancera TC

## 2021-01-06 ENCOUNTER — OFFICE VISIT (OUTPATIENT)
Dept: FAMILY MEDICINE | Facility: CLINIC | Age: 51
End: 2021-01-06
Payer: COMMERCIAL

## 2021-01-06 VITALS
HEART RATE: 62 BPM | SYSTOLIC BLOOD PRESSURE: 134 MMHG | WEIGHT: 122 LBS | TEMPERATURE: 98.9 F | BODY MASS INDEX: 21.61 KG/M2 | DIASTOLIC BLOOD PRESSURE: 85 MMHG | OXYGEN SATURATION: 99 %

## 2021-01-06 DIAGNOSIS — N93.8 DUB (DYSFUNCTIONAL UTERINE BLEEDING): ICD-10-CM

## 2021-01-06 DIAGNOSIS — Z30.430 ENCOUNTER FOR IUD INSERTION: Primary | ICD-10-CM

## 2021-01-06 PROCEDURE — 58300 INSERT INTRAUTERINE DEVICE: CPT | Performed by: PHYSICIAN ASSISTANT

## 2021-01-06 ASSESSMENT — PAIN SCALES - GENERAL: PAINLEVEL: NO PAIN (0)

## 2021-01-06 NOTE — PROGRESS NOTES
Assessment & Plan     Encounter for IUD insertion  DUB (dysfunctional uterine bleeding)  See below  IUD inserted without any complications.   She will follow up as needed.   - levonorgestrel (MIRENA) 20 MCG/24HR IUD 20 mcg      Kristen M. Kehr, PA-C [facility] [place]    Subjective     Sarah Padilla is a 50 year old who presents to clinic today for the following health issues     HPI         IUD insertion.   Sarah had an ultrasound within the past week showing normal endometrium. She has been having heavy bleeding at least 2 times / month. We had discussed using an IUD for management. She is here today for mirena IUD insertion.         Review of Systems   Constitutional, HEENT, cardiovascular, pulmonary, GI, , musculoskeletal, neuro, skin, endocrine and psych systems are negative, except as otherwise noted.      Objective    /85   Pulse 62   Temp 98.9  F (37.2  C) (Tympanic)   Wt 55.3 kg (122 lb)   SpO2 99%   BMI 21.61 kg/m    Body mass index is 21.61 kg/m .  Physical Exam   GENERAL: healthy, alert and no distress    I discussed the method, effectiveness, contraindications, risk, benefits, alternatives and the insertion procedure itself.  Patient was an appropriate candidate and desired to proceed. Written consent signed.     Exam:  Pelvic: External genitalia and vagina normal. Bimanual normal.    After appropriate preparation, the cervix was grasped with a tenaculum and the uterine cavity sounded to 8 cm.  The Mirena IUD was inserted using standard and sterile technique.  The strings were trimmed to approximately 2.5cm.  No complications. Patient tolerated the procedure well.    IUD Lot#: [id number]  Exp Date: January 2023  NDC#:KQI75407-024-33    The IUD effectiveness is 5 years.   Followup should be in 3 months with Kristen Kehr PA-C.  Alert clinic to any problems.

## 2021-01-06 NOTE — PATIENT INSTRUCTIONS
Patient Education     Levonorgestrel intrauterine device (IUD)  Brand Names: Kyleena, LILETTA, Mirena, Michelle  What is this medicine?  LEVONORGESTREL IUD (JANELLE leggettl) is a contraceptive (birth control) device. The device is placed inside the uterus by a healthcare professional. It is used to prevent pregnancy. This device can also be used to treat heavy bleeding that occurs during your period.  How should I use this medicine?  This device is placed inside the uterus by a health care professional.  Talk to your pediatrician regarding the use of this medicine in children. Special care may be needed.  What side effects may I notice from receiving this medicine?  Side effects that you should report to your doctor or health care professional as soon as possible:    allergic reactions like skin rash, itching or hives, swelling of the face, lips, or tongue    fever, flu-like symptoms    genital sores    high blood pressure    no menstrual period for 6 weeks during use    pain, swelling, warmth in the leg    pelvic pain or tenderness    severe or sudden headache    signs of pregnancy    stomach cramping    sudden shortness of breath    trouble with balance, talking, or walking    unusual vaginal bleeding, discharge    yellowing of the eyes or skin  Side effects that usually do not require medical attention (report to your doctor or health care professional if they continue or are bothersome):    acne    breast pain    change in sex drive or performance    changes in weight    cramping, dizziness, or faintness while the device is being inserted    headache    irregular menstrual bleeding within first 3 to 6 months of use    nausea  What may interact with this medicine?  Do not take this medicine with any of the following medications:    amprenavir    bosentan    fosamprenavir  This medicine may also interact with the following medications:    aprepitant    armodafinil    barbiturate medicines for inducing sleep or  treating seizures    bexarotene    boceprevir    griseofulvin    medicines to treat seizures like carbamazepine, ethotoin, felbamate, oxcarbazepine, phenytoin, topiramate    modafinil    pioglitazone    rifabutin    rifampin    rifapentine    some medicines to treat HIV infection like atazanavir, efavirenz, indinavir, lopinavir, nelfinavir, tipranavir, ritonavir    Manns Choice's wort    warfarin  What if I miss a dose?  This does not apply. Depending on the brand of device you have inserted, the device will need to be replaced every 3 to 6 years if you wish to continue using this type of birth control.  Where should I keep my medicine?  This does not apply.  What should I tell my health care provider before I take this medicine?  They need to know if you have any of these conditions:    abnormal Pap smear    cancer of the breast, uterus, or cervix    diabetes    endometritis    genital or pelvic infection now or in the past    have more than one sexual partner or your partner has more than one partner    heart disease    history of an ectopic or tubal pregnancy    immune system problems    IUD in place    liver disease or tumor    problems with blood clots or take blood-thinners    seizures    use intravenous drugs    uterus of unusual shape    vaginal bleeding that has not been explained    an unusual or allergic reaction to levonorgestrel, other hormones, silicone, or polyethylene, medicines, foods, dyes, or preservatives    pregnant or trying to get pregnant    breast-feeding  What should I watch for while using this medicine?  Visit your doctor or health care professional for regular check ups. See your doctor if you or your partner has sexual contact with others, becomes HIV positive, or gets a sexual transmitted disease.  This product does not protect you against HIV infection (AIDS) or other sexually transmitted diseases.  You can check the placement of the IUD yourself by reaching up to the top of your vagina  with clean fingers to feel the threads. Do not pull on the threads. It is a good habit to check placement after each menstrual period. Call your doctor right away if you feel more of the IUD than just the threads or if you cannot feel the threads at all.  The IUD may come out by itself. You may become pregnant if the device comes out. If you notice that the IUD has come out use a backup birth control method like condoms and call your health care provider.  Using tampons will not change the position of the IUD and are okay to use during your period.  This IUD can be safely scanned with magnetic resonance imaging (MRI) only under specific conditions. Before you have an MRI, tell your healthcare provider that you have an IUD in place, and which type of IUD you have in place.  NOTE:This sheet is a summary. It may not cover all possible information. If you have questions about this medicine, talk to your doctor, pharmacist, or health care provider. Copyright  2020 [employer]

## 2021-01-06 NOTE — NURSING NOTE
"Chief Complaint   Patient presents with     IUD       Initial /85   Pulse 62   Temp 98.9  F (37.2  C) (Tympanic)   Wt 55.3 kg (122 lb)   SpO2 99%   BMI 21.61 kg/m   Estimated body mass index is 21.61 kg/m  as calculated from the following:    Height as of 12/7/20: 1.6 m (5' 3\").    Weight as of this encounter: 55.3 kg (122 lb).  Medication Reconciliation: complete    SAAD Fabian MA    "

## 2021-09-11 ENCOUNTER — OFFICE VISIT (OUTPATIENT)
Dept: URGENT CARE | Facility: URGENT CARE | Age: 51
End: 2021-09-11
Payer: COMMERCIAL

## 2021-09-11 VITALS
OXYGEN SATURATION: 100 % | HEART RATE: 78 BPM | SYSTOLIC BLOOD PRESSURE: 138 MMHG | DIASTOLIC BLOOD PRESSURE: 80 MMHG | TEMPERATURE: 97.5 F

## 2021-09-11 DIAGNOSIS — R79.89 ABNORMAL CBC: ICD-10-CM

## 2021-09-11 DIAGNOSIS — R50.9 FEELS FEVERISH: ICD-10-CM

## 2021-09-11 DIAGNOSIS — R31.29 MICROSCOPIC HEMATURIA: ICD-10-CM

## 2021-09-11 DIAGNOSIS — Z20.822 SUSPECTED COVID-19 VIRUS INFECTION: Primary | ICD-10-CM

## 2021-09-11 LAB
ALBUMIN UR-MCNC: NEGATIVE MG/DL
APPEARANCE UR: CLEAR
BACTERIA #/AREA URNS HPF: ABNORMAL /HPF
BASOPHILS # BLD AUTO: 0 10E3/UL (ref 0–0.2)
BASOPHILS NFR BLD AUTO: 1 %
BILIRUB UR QL STRIP: NEGATIVE
COLOR UR AUTO: YELLOW
EOSINOPHIL # BLD AUTO: 0.1 10E3/UL (ref 0–0.7)
EOSINOPHIL NFR BLD AUTO: 1 %
ERYTHROCYTE [DISTWIDTH] IN BLOOD BY AUTOMATED COUNT: 12.9 % (ref 10–15)
GLUCOSE UR STRIP-MCNC: NEGATIVE MG/DL
HCT VFR BLD AUTO: 36.8 % (ref 35–47)
HGB BLD-MCNC: 12.3 G/DL (ref 11.7–15.7)
HGB UR QL STRIP: ABNORMAL
HOLD SPECIMEN: NORMAL
HOLD SPECIMEN: NORMAL
IMM GRANULOCYTES # BLD: 0 10E3/UL
IMM GRANULOCYTES NFR BLD: 0 %
KETONES UR STRIP-MCNC: NEGATIVE MG/DL
LEUKOCYTE ESTERASE UR QL STRIP: ABNORMAL
LYMPHOCYTES # BLD AUTO: 1.4 10E3/UL (ref 0.8–5.3)
LYMPHOCYTES NFR BLD AUTO: 23 %
MCH RBC QN AUTO: 31.9 PG (ref 26.5–33)
MCHC RBC AUTO-ENTMCNC: 33.4 G/DL (ref 31.5–36.5)
MCV RBC AUTO: 96 FL (ref 78–100)
MONOCYTES # BLD AUTO: 0.7 10E3/UL (ref 0–1.3)
MONOCYTES NFR BLD AUTO: 12 %
MONOCYTES NFR BLD AUTO: NEGATIVE %
NEUTROPHILS # BLD AUTO: 3.8 10E3/UL (ref 1.6–8.3)
NEUTROPHILS NFR BLD AUTO: 63 %
NITRATE UR QL: NEGATIVE
NRBC # BLD AUTO: 0 10E3/UL
NRBC BLD AUTO-RTO: 0 /100
PH UR STRIP: 6 [PH] (ref 5–7)
PLATELET # BLD AUTO: 346 10E3/UL (ref 150–450)
RBC # BLD AUTO: 3.85 10E6/UL (ref 3.8–5.2)
RBC #/AREA URNS AUTO: ABNORMAL /HPF
SP GR UR STRIP: <=1.005 (ref 1–1.03)
SQUAMOUS #/AREA URNS AUTO: ABNORMAL /LPF
UROBILINOGEN UR STRIP-ACNC: 0.2 E.U./DL
WBC # BLD AUTO: 5.9 10E3/UL (ref 4–11)
WBC #/AREA URNS AUTO: ABNORMAL /HPF

## 2021-09-11 PROCEDURE — U0003 INFECTIOUS AGENT DETECTION BY NUCLEIC ACID (DNA OR RNA); SEVERE ACUTE RESPIRATORY SYNDROME CORONAVIRUS 2 (SARS-COV-2) (CORONAVIRUS DISEASE [COVID-19]), AMPLIFIED PROBE TECHNIQUE, MAKING USE OF HIGH THROUGHPUT TECHNOLOGIES AS DESCRIBED BY CMS-2020-01-R: HCPCS | Performed by: FAMILY MEDICINE

## 2021-09-11 PROCEDURE — 87186 SC STD MICRODIL/AGAR DIL: CPT | Performed by: FAMILY MEDICINE

## 2021-09-11 PROCEDURE — 85025 COMPLETE CBC W/AUTO DIFF WBC: CPT | Performed by: FAMILY MEDICINE

## 2021-09-11 PROCEDURE — 99214 OFFICE O/P EST MOD 30 MIN: CPT | Performed by: FAMILY MEDICINE

## 2021-09-11 PROCEDURE — 87086 URINE CULTURE/COLONY COUNT: CPT | Performed by: FAMILY MEDICINE

## 2021-09-11 PROCEDURE — 81001 URINALYSIS AUTO W/SCOPE: CPT | Performed by: FAMILY MEDICINE

## 2021-09-11 PROCEDURE — 36415 COLL VENOUS BLD VENIPUNCTURE: CPT | Performed by: FAMILY MEDICINE

## 2021-09-11 PROCEDURE — U0005 INFEC AGEN DETEC AMPLI PROBE: HCPCS | Performed by: FAMILY MEDICINE

## 2021-09-11 PROCEDURE — 86308 HETEROPHILE ANTIBODY SCREEN: CPT | Performed by: FAMILY MEDICINE

## 2021-09-11 NOTE — PROGRESS NOTES
Chief comnplain: fever    Drove [place] for remote camping    First day of hiking and camping 6 days ago   That afternoon started feeling feverish and achy   Not sure if just tired from camping  Took ibuprofen  Symptoms continued  Symptoms aren't horrible enough   ibupfrofen seemed to help  Thought was just from camping and hiking    4 days ago got a bit lightheaded in the shower and had couple of dizzy spell where she described more as lightheaded ness and needed to lay down. Was outside not sure was heat related   Never happened again     Has not been drinking all week  Also constipated and appetite not     Just back home now   Did seem to be feeling better since getting out of the hot weather     Chest Pain or shortness of breath: No  Orthopnea, Edema, PND: No  Cough, colds, or respiratory symptoms: No  No loss of taste or smell   Abdominal Pain: No  Urinary symptoms or Flank Pain:  No symptoms but wondering   Focal numbness or weakness: No  Rash: No  History of Thyroid problems/thyroid medication compliance: No  Headache or Neck stiffness: Yes: some mild headache - chronic migraines- not too out of the norm for her   Joint Pain or Arthralgias: No  Melena/Hematochezia/Hematemesis: No  Depression or Mood changes: No  No recent change in medications: No  Concern about recent tick-bite: No  Concern about recent travel/possible exposure: No    Problem list and histories reviewed & adjusted, as indicated.  Additional history: as documented    Problem list, Medication list, Allergies, and Medical/Social/Surgical histories reviewed in [facility] and updated as appropriate.    ROS:  Constitutional, HEENT, cardiovascular, pulmonary, GI, , musculoskeletal, neuro, skin, endocrine and psych systems are negative, except as otherwise noted.    OBJECTIVE:                                                    BP (!) 141/90   Pulse 78   Temp 97.5  F (36.4  C) (Tympanic)   SpO2 100%   There is no height or weight on file to calculate  BMI.  GENERAL: healthy, alert and no distress  EYES: Eyes grossly normal to inspection, PERRL and conjunctivae and sclerae normal  HENT: ear canals and TM's normal, nose and mouth without ulcers or lesions  NECK: no adenopathy, no asymmetry, masses, or scars and thyroid normal to palpation  RESP: lungs clear to auscultation - no rales, rhonchi or wheezes  CV: regular rate and rhythm, normal S1 S2, no S3 or S4, no murmur, click or rub, no peripheral edema and peripheral pulses strong  ABDOMEN: soft, nontender, no hepatosplenomegaly, no masses and bowel sounds normal  MS: no gross musculoskeletal defects noted, no edema  SKIN: no suspicious lesions or rashes  NEURO: Normal strength and tone, mentation intact and speech normal  PSYCH: mentation appears normal, affect normal/bright    Diagnostic Test Results:  Results for orders placed or performed in visit on 09/11/21 (from the past 24 hour(s))   CBC with platelets and differential    Narrative    The following orders were created for panel order CBC with platelets and differential.  Procedure                               Abnormality         Status                     ---------                               -----------         ------                     CBC with platelets and d...[[id number]]                      In process                   Please view results for these tests on the individual orders.   UA Macro with Reflex to Micro and Culture - lab collect    Specimen: Urine, Midstream   Result Value Ref Range    Color Urine Yellow Colorless, Straw, Light Yellow, Yellow    Appearance Urine Clear Clear    Glucose Urine Negative Negative mg/dL    Bilirubin Urine Negative Negative    Ketones Urine Negative Negative mg/dL    Specific Gravity Urine <=1.005 1.003 - 1.035    Blood Urine Small (A) Negative    pH Urine 6.0 5.0 - 7.0    Protein Albumin Urine Negative Negative mg/dL    Urobilinogen Urine 0.2 0.2, 1.0 E.U./dL    Nitrite Urine Negative Negative    Leukocyte  Esterase Urine Trace (A) Negative   Extra Tube    Narrative    The following orders were created for panel order Extra Tube.  Procedure                               Abnormality         Status                     ---------                               -----------         ------                     Extra Serum Separator Tu...[[id number]]                      Final result               Extra Green Top (Lithium...[[id number]]                      Final result                 Please view results for these tests on the individual orders.   Extra Serum Separator Tube (SST)   Result Value Ref Range    Hold Specimen JIC    Extra Green Top (Lithium Heparin) Tube   Result Value Ref Range    Hold Specimen JIC    Urine Microscopic   Result Value Ref Range    Bacteria Urine Few (A) None Seen /HPF    RBC Urine 2-5 (A) 0-2 /HPF /HPF    WBC Urine 5-10 (A) 0-5 /HPF /HPF    Squamous Epithelials Urine Few (A) None Seen /LPF    Narrative    Urine Culture not indicated   Mononucleosis screen   Result Value Ref Range    Mononucleosis Screen Negative Negative        ASSESSMENT/PLAN:                                                        ICD-10-CM    1. Suspected COVID-19 virus infection  Z20.822 Symptomatic COVID-19 Virus (Coronavirus) by PCR Nose   2. Feels feverish  R50.9 UA Macro with Reflex to Micro and Culture - lab collect     CBC with platelets and differential     Extra Tube     Extra Tube     CBC with platelets and differential     UA Macro with Reflex to Micro and Culture - lab collect     Urine Microscopic     Urine Culture Aerobic Bacterial - lab collect     Mononucleosis screen     Mononucleosis screen     Urine Culture Aerobic Bacterial - lab collect   3. Microscopic hematuria  R31.29    4. Abnormal CBC  R79.89      Abnormal cbc per lab possible atypical lymphocytes - will be sent out for testing awaiting official result  Mono negative     Microscopic hematuria and wbc 5-10- will send for culture. We discussed empiric  treatment , because no urinary signs or symptoms decided to hold off  No protenuria no biliburinuria    covid rule out   Quarantine restrictions discussed      Recommend follow up with primary care provider if no relief in 3 days, sooner if worse  Adverse reactions of medications discussed.  Aware to come back in if with worsening symptoms or if no relief despite treatment plan  Patient voiced understanding and had no further questions.     MD Catherine Montgomery MD  [facility]

## 2021-09-12 ENCOUNTER — TELEPHONE (OUTPATIENT)
Dept: URGENT CARE | Facility: URGENT CARE | Age: 51
End: 2021-09-12

## 2021-09-12 LAB — SARS-COV-2 RNA RESP QL NAA+PROBE: NEGATIVE

## 2021-09-12 RX ORDER — SULFAMETHOXAZOLE/TRIMETHOPRIM 800-160 MG
1 TABLET ORAL 2 TIMES DAILY
Qty: 14 TABLET | Refills: 0 | Status: SHIPPED | OUTPATIENT
Start: 2021-09-12 | End: 2021-09-19

## 2021-09-13 LAB — BACTERIA UR CULT: ABNORMAL

## 2021-09-19 ENCOUNTER — HEALTH MAINTENANCE LETTER (OUTPATIENT)
Age: 51
End: 2021-09-19

## 2021-12-11 DIAGNOSIS — G43.009 MIGRAINE WITHOUT AURA AND WITHOUT STATUS MIGRAINOSUS, NOT INTRACTABLE: ICD-10-CM

## 2021-12-12 ASSESSMENT — ENCOUNTER SYMPTOMS
DYSURIA: 0
ABDOMINAL PAIN: 0
HEMATURIA: 0
DIZZINESS: 0
JOINT SWELLING: 0
WEAKNESS: 0
FEVER: 0
NERVOUS/ANXIOUS: 0
PALPITATIONS: 0
PARESTHESIAS: 0
ARTHRALGIAS: 0
HEADACHES: 0
EYE PAIN: 0
HEMATOCHEZIA: 0
CHILLS: 0
SHORTNESS OF BREATH: 0
NAUSEA: 0
FREQUENCY: 0
SORE THROAT: 0
COUGH: 0
HEARTBURN: 0
BREAST MASS: 0
MYALGIAS: 0
DIARRHEA: 0
CONSTIPATION: 0

## 2021-12-13 NOTE — TELEPHONE ENCOUNTER
Patient has upcoming appointment, will postpone message to be certain this is addressed.   Next 5 appointments (look out 90 days)    Dec 15, 2021  1:50 PM  (Arrive by 1:30 PM)  Adult Preventative Visit with Kristen M Kehr, PA-C  [facility] ([facility] ) [address] [facility] [address]  [phone]          Yas Schaffer RN

## 2021-12-15 ENCOUNTER — OFFICE VISIT (OUTPATIENT)
Dept: FAMILY MEDICINE | Facility: CLINIC | Age: 51
End: 2021-12-15
Payer: COMMERCIAL

## 2021-12-15 VITALS
SYSTOLIC BLOOD PRESSURE: 144 MMHG | HEIGHT: 62 IN | OXYGEN SATURATION: 99 % | BODY MASS INDEX: 23.92 KG/M2 | DIASTOLIC BLOOD PRESSURE: 94 MMHG | TEMPERATURE: 97.7 F | WEIGHT: 130 LBS | HEART RATE: 83 BPM

## 2021-12-15 DIAGNOSIS — F33.0 MILD RECURRENT MAJOR DEPRESSION (H): ICD-10-CM

## 2021-12-15 DIAGNOSIS — Z00.00 ROUTINE GENERAL MEDICAL EXAMINATION AT A HEALTH CARE FACILITY: Primary | ICD-10-CM

## 2021-12-15 DIAGNOSIS — G43.009 MIGRAINE WITHOUT AURA AND WITHOUT STATUS MIGRAINOSUS, NOT INTRACTABLE: ICD-10-CM

## 2021-12-15 DIAGNOSIS — R03.0 ELEVATED BP WITHOUT DIAGNOSIS OF HYPERTENSION: ICD-10-CM

## 2021-12-15 DIAGNOSIS — B35.1 ONYCHOMYCOSIS: ICD-10-CM

## 2021-12-15 DIAGNOSIS — Z23 HIGH PRIORITY FOR 2019-NCOV VACCINE: ICD-10-CM

## 2021-12-15 DIAGNOSIS — Z12.31 VISIT FOR SCREENING MAMMOGRAM: ICD-10-CM

## 2021-12-15 PROCEDURE — 99396 PREV VISIT EST AGE 40-64: CPT | Performed by: PHYSICIAN ASSISTANT

## 2021-12-15 PROCEDURE — 0004A COVID-19,PF,PFIZER (12+ YRS): CPT | Performed by: PHYSICIAN ASSISTANT

## 2021-12-15 PROCEDURE — 91300 COVID-19,PF,PFIZER (12+ YRS): CPT | Performed by: PHYSICIAN ASSISTANT

## 2021-12-15 PROCEDURE — 99214 OFFICE O/P EST MOD 30 MIN: CPT | Mod: 25 | Performed by: PHYSICIAN ASSISTANT

## 2021-12-15 RX ORDER — CITALOPRAM HYDROBROMIDE 20 MG/1
TABLET ORAL
Qty: 30 TABLET | Refills: 1 | Status: SHIPPED | OUTPATIENT
Start: 2021-12-15 | End: 2022-01-31

## 2021-12-15 RX ORDER — RIZATRIPTAN BENZOATE 10 MG/1
10 TABLET, ORALLY DISINTEGRATING ORAL
Qty: 12 TABLET | Refills: 11 | Status: SHIPPED | OUTPATIENT
Start: 2021-12-15 | End: 2022-04-28

## 2021-12-15 RX ORDER — TERBINAFINE HYDROCHLORIDE 250 MG/1
250 TABLET ORAL DAILY
Qty: 90 TABLET | Refills: 0 | Status: SHIPPED | OUTPATIENT
Start: 2021-12-15 | End: 2022-03-15

## 2021-12-15 ASSESSMENT — ENCOUNTER SYMPTOMS
NAUSEA: 0
CONSTIPATION: 0
HEMATURIA: 0
HEADACHES: 0
BREAST MASS: 0
DYSURIA: 0
JOINT SWELLING: 0
DIARRHEA: 0
DIZZINESS: 0
HEARTBURN: 0
PALPITATIONS: 0
MYALGIAS: 0
NERVOUS/ANXIOUS: 0
WEAKNESS: 0
FEVER: 0
HEMATOCHEZIA: 0
EYE PAIN: 0
PARESTHESIAS: 0
COUGH: 0
SHORTNESS OF BREATH: 0
SORE THROAT: 0
CHILLS: 0
ABDOMINAL PAIN: 0
FREQUENCY: 0
ARTHRALGIAS: 0

## 2021-12-15 ASSESSMENT — ANXIETY QUESTIONNAIRES
7. FEELING AFRAID AS IF SOMETHING AWFUL MIGHT HAPPEN: NOT AT ALL
2. NOT BEING ABLE TO STOP OR CONTROL WORRYING: SEVERAL DAYS
3. WORRYING TOO MUCH ABOUT DIFFERENT THINGS: SEVERAL DAYS
6. BECOMING EASILY ANNOYED OR IRRITABLE: SEVERAL DAYS
5. BEING SO RESTLESS THAT IT IS HARD TO SIT STILL: NOT AT ALL
1. FEELING NERVOUS, ANXIOUS, OR ON EDGE: SEVERAL DAYS
GAD7 TOTAL SCORE: 5
IF YOU CHECKED OFF ANY PROBLEMS ON THIS QUESTIONNAIRE, HOW DIFFICULT HAVE THESE PROBLEMS MADE IT FOR YOU TO DO YOUR WORK, TAKE CARE OF THINGS AT HOME, OR GET ALONG WITH OTHER PEOPLE: SOMEWHAT DIFFICULT

## 2021-12-15 ASSESSMENT — PATIENT HEALTH QUESTIONNAIRE - PHQ9
5. POOR APPETITE OR OVEREATING: SEVERAL DAYS
SUM OF ALL RESPONSES TO PHQ QUESTIONS 1-9: 8

## 2021-12-15 ASSESSMENT — PAIN SCALES - GENERAL: PAINLEVEL: NO PAIN (0)

## 2021-12-15 ASSESSMENT — MIFFLIN-ST. JEOR: SCORE: 1161.9

## 2021-12-15 NOTE — PROGRESS NOTES
SUBJECTIVE:   CC: Sarah Padilla is an 51 year old woman who presents for preventive health visit.       Patient has been advised of split billing requirements and indicates understanding: Yes  Healthy Habits:     Getting at least 3 servings of Calcium per day:  Yes    Bi-annual eye exam:  Yes    Dental care twice a year:  Yes    Sleep apnea or symptoms of sleep apnea:  None    Diet:  Regular (no restrictions)    Frequency of exercise:  2-3 days/week    Duration of exercise:  30-45 minutes    Taking medications regularly:  Yes    Medication side effects:  None    PHQ-2 Total Score: 2    Additional concerns today:  Yes    PROBLEMS TO ADD....  1. Depression: she has had a lot of changes in the past year with divorce and moving.   She feels she is doing well overall with getting to work and has made good decisions, but she is numb and unable to enjoy things. She has no motivation and moods are down.   2. Migraine: she will need a refill of the maxalt.   3. Toenail fungus: present x 2 years. She was using a topical treatment with no relief. This mainly affects her right great toe.       Today's PHQ-2 Score:   PHQ-2 ( 1999 Pfizer) 12/12/2021   Q1: Little interest or pleasure in doing things 1   Q2: Feeling down, depressed or hopeless 1   PHQ-2 Score 2   PHQ-2 Total Score (12-17 Years)- Positive if 3 or more points; Administer PHQ-A if positive -   Q1: Little interest or pleasure in doing things Several days   Q2: Feeling down, depressed or hopeless Several days   PHQ-2 Score 2       Abuse: Current or Past (Physical, Sexual or Emotional) - No  Do you feel safe in your environment? Yes    Have you ever done Advance Care Planning? (For example, a Health Directive, POLST, or a discussion with a medical provider or your loved ones about your wishes): No, advance care planning information given to patient to review.  Patient declined advance care planning discussion at this time.    Social History     Tobacco Use     Smoking  status: Never Smoker     Smokeless tobacco: Never Used   Substance Use Topics     Alcohol use: Yes     Comment: occasionally; socially     If you drink alcohol do you typically have >3 drinks per day or >7 drinks per week? No    No flowsheet data found.    Reviewed orders with patient.  Reviewed health maintenance and updated orders accordingly - Yes  BP Readings from Last 3 Encounters:   12/15/21 (!) 144/94   09/11/21 138/80   01/06/21 134/85    Wt Readings from Last 3 Encounters:   12/15/21 59 kg (130 lb)   01/06/21 55.3 kg (122 lb)   12/07/20 54.4 kg (120 lb)                  Patient Active Problem List   Diagnosis     CARDIOVASCULAR SCREENING; LDL GOAL LESS THAN 160     Inflammatory arthritis     Other iron deficiency anemia     Migraine without aura and without status migrainosus, not intractable     Daily headache     Bunion     DUB (dysfunctional uterine bleeding)     Past Surgical History:   Procedure Laterality Date     BUNIONECTOMY Right 12/31/2019    Procedure: BUNIONECTOMY Right;  Surgeon: Juan J Bustillos DPM;  Location: [facility] OR     No History of Surgery         Social History     Tobacco Use     Smoking status: Never Smoker     Smokeless tobacco: Never Used   Substance Use Topics     Alcohol use: Yes     Comment: occasionally; socially     Family History   Problem Relation Age of Onset     Alzheimer Disease Father      Hypertension Father      Respiratory Maternal Grandmother         Emphysema     Cancer Maternal Grandfather         Lung/Skin         No Known Allergies  Recent Labs   Lab Test 07/15/17  0957 04/25/16  1744 04/17/16  0922 02/17/14  0945   LDL 49  --  58 45   HDL 74  --  72 61   TRIG 44  --  47 42   CR  --   --   --  0.77   GFRESTIMATED  --   --   --  82   GFRESTBLACK  --   --   --  >90   POTASSIUM  --   --   --  4.2   TSH  --  1.90  --   --         Breast Cancer Screening:    Breast CA Risk Assessment (FHS-7) 12/12/2021   Do you have a family history of breast, colon, or ovarian cancer?  No / Unknown         Mammogram Screening: Recommended annual mammography  Pertinent mammograms are reviewed under the imaging tab.    History of abnormal Pap smear:   NO - age 30-65 PAP every 5 years with negative HPV co-testing recommended  Last 3 Pap and HPV Results:   PAP / HPV Latest Ref Rng & Units 11/12/2018 4/25/2016 3/29/2011   PAP (Historical) - NIL NIL NIL   HPV16 NEG:Negative Negative - -   HPV18 NEG:Negative Negative - -   HRHPV NEG:Negative Negative - -     PAP / HPV Latest Ref Rng & Units 11/12/2018 4/25/2016 3/29/2011   PAP (Historical) - NIL NIL NIL   HPV16 NEG:Negative Negative - -   HPV18 NEG:Negative Negative - -   HRHPV NEG:Negative Negative - -     Reviewed and updated as needed this visit by clinical staff  Tobacco  Allergies  Meds  Problems  Med Hx  Surg Hx  Fam Hx  Soc Hx         Reviewed and updated as needed this visit by Provider  Tobacco  Allergies  Meds  Problems  Med Hx  Surg Hx  Fam Hx        Past Medical History:   Diagnosis Date     Daily headache 10/2/2016     Daily headache      Daily headache      Inflammatory arthritis 4/4/2012     NO ACTIVE PROBLEMS       Past Surgical History:   Procedure Laterality Date     BUNIONECTOMY Right 12/31/2019    Procedure: BUNIONECTOMY Right;  Surgeon: Juan J Bustillos DPM;  Location: [facility] OR     No History of Surgery         Review of Systems   Constitutional: Negative for chills and fever.   HENT: Negative for congestion, ear pain, hearing loss and sore throat.    Eyes: Negative for pain and visual disturbance.   Respiratory: Negative for cough and shortness of breath.    Cardiovascular: Negative for chest pain, palpitations and peripheral edema.   Gastrointestinal: Negative for abdominal pain, constipation, diarrhea, heartburn, hematochezia and nausea.   Breasts:  Negative for tenderness, breast mass and discharge.   Genitourinary: Negative for dysuria, frequency, genital sores, hematuria, pelvic pain, urgency, vaginal bleeding and  "vaginal discharge.   Musculoskeletal: Negative for arthralgias, joint swelling and myalgias.   Skin: Negative for rash.   Neurological: Negative for dizziness, weakness, headaches and paresthesias.   Psychiatric/Behavioral: Negative for mood changes. The patient is not nervous/anxious.           OBJECTIVE:   BP (!) 144/94   Pulse 83   Temp 97.7  F (36.5  C) (Tympanic)   Ht 1.581 m (5' 2.25\")   Wt 59 kg (130 lb)   SpO2 99%   BMI 23.59 kg/m    Physical Exam  GENERAL APPEARANCE: healthy, alert and no distress  EYES: Eyes grossly normal to inspection, PERRL and conjunctivae and sclerae normal  HENT: ear canals and TM's normal, nose and mouth without ulcers or lesions, oropharynx clear and oral mucous membranes moist  NECK: no adenopathy, no asymmetry, masses, or scars and thyroid normal to palpation  RESP: lungs clear to auscultation - no rales, rhonchi or wheezes  BREAST: normal without masses, tenderness or nipple discharge and no palpable axillary masses or adenopathy  CV: regular rate and rhythm, normal S1 S2, no S3 or S4, no murmur, click or rub, no peripheral edema and peripheral pulses strong  ABDOMEN: soft, nontender, no hepatosplenomegaly, no masses and bowel sounds normal  MS: no musculoskeletal defects are noted and gait is age appropriate without ataxia  SKIN: no suspicious lesions or rashes, yellow discoloration of her right great toenail  NEURO: Normal strength and tone, sensory exam grossly normal, mentation intact and speech normal  PSYCH: mentation appears normal and affect normal/bright    Diagnostic Test Results:  Labs reviewed in Epic    ASSESSMENT/PLAN:   (Z00.00) Routine general medical examination at a health care facility  (primary encounter diagnosis)  Comment: Health maintenance reviewed and updated.    (G43.009) Migraine without aura and without status migrainosus, not intractable  Comment: stable, refills given   Plan: rizatriptan (MAXALT-MLT) 10 MG ODT            (F33.0) Mild recurrent " "major depression (H)  Comment: start the citalopram. Side effects and how to take the medication discussed.  Return in 1-2 months for recheck.   Add vitamin D also.   Plan: citalopram (CELEXA) 20 MG tablet            (B35.1) Onychomycosis  Comment: treat with lamisil  Plan: terbinafine (LAMISIL) 250 MG tablet            (R03.0) Elevated BP without diagnosis of hypertension  Comment: lifestyle changes reviewed.   Moods may be contributing.   Plan to check at home also.   Return in 1-2 months when she is back for recheck of the depression.   Add medication at that time if still high.       (Z12.31) Visit for screening mammogram  Plan: *MA Screening Digital Bilateral            (Z23) High priority for 2019-nCoV vaccine      Patient has been advised of split billing requirements and indicates understanding: Yes  COUNSELING:  Reviewed preventive health counseling, as reflected in patient instructions       Regular exercise       Healthy diet/nutrition    Estimated body mass index is 23.59 kg/m  as calculated from the following:    Height as of this encounter: 1.581 m (5' 2.25\").    Weight as of this encounter: 59 kg (130 lb).        She reports that she has never smoked. She has never used smokeless tobacco.      Counseling Resources:  ATP IV Guidelines  Pooled Cohorts Equation Calculator  Breast Cancer Risk Calculator  BRCA-Related Cancer Risk Assessment: FHS-7 Tool  FRAX Risk Assessment  ICSI Preventive Guidelines  Dietary Guidelines for Americans, 2010  USDA's MyPlate  ASA Prophylaxis  Lung CA Screening    Kristen M. Kehr, PA-C [facility]  "

## 2021-12-16 RX ORDER — RIZATRIPTAN BENZOATE 10 MG/1
TABLET, ORALLY DISINTEGRATING ORAL
Qty: 12 TABLET | Refills: 9 | OUTPATIENT
Start: 2021-12-16

## 2021-12-16 ASSESSMENT — ANXIETY QUESTIONNAIRES: GAD7 TOTAL SCORE: 5

## 2022-01-09 ENCOUNTER — HEALTH MAINTENANCE LETTER (OUTPATIENT)
Age: 52
End: 2022-01-09

## 2022-01-31 ENCOUNTER — OFFICE VISIT (OUTPATIENT)
Dept: FAMILY MEDICINE | Facility: CLINIC | Age: 52
End: 2022-01-31
Payer: COMMERCIAL

## 2022-01-31 VITALS
DIASTOLIC BLOOD PRESSURE: 102 MMHG | WEIGHT: 129 LBS | SYSTOLIC BLOOD PRESSURE: 150 MMHG | BODY MASS INDEX: 23.41 KG/M2 | HEART RATE: 70 BPM | TEMPERATURE: 96.1 F | OXYGEN SATURATION: 98 %

## 2022-01-31 DIAGNOSIS — F33.0 MILD RECURRENT MAJOR DEPRESSION (H): ICD-10-CM

## 2022-01-31 DIAGNOSIS — I10 ESSENTIAL HYPERTENSION: Primary | ICD-10-CM

## 2022-01-31 PROCEDURE — 99214 OFFICE O/P EST MOD 30 MIN: CPT | Performed by: PHYSICIAN ASSISTANT

## 2022-01-31 RX ORDER — CITALOPRAM HYDROBROMIDE 20 MG/1
TABLET ORAL
Qty: 90 TABLET | Refills: 3 | Status: SHIPPED | OUTPATIENT
Start: 2022-01-31 | End: 2022-02-15

## 2022-01-31 RX ORDER — LOSARTAN POTASSIUM 50 MG/1
50 TABLET ORAL DAILY
Qty: 30 TABLET | Refills: 1 | Status: SHIPPED | OUTPATIENT
Start: 2022-01-31 | End: 2022-02-28

## 2022-01-31 ASSESSMENT — ANXIETY QUESTIONNAIRES
3. WORRYING TOO MUCH ABOUT DIFFERENT THINGS: SEVERAL DAYS
6. BECOMING EASILY ANNOYED OR IRRITABLE: NOT AT ALL
GAD7 TOTAL SCORE: 4
5. BEING SO RESTLESS THAT IT IS HARD TO SIT STILL: NOT AT ALL
GAD7 TOTAL SCORE: 4
2. NOT BEING ABLE TO STOP OR CONTROL WORRYING: SEVERAL DAYS
7. FEELING AFRAID AS IF SOMETHING AWFUL MIGHT HAPPEN: SEVERAL DAYS
4. TROUBLE RELAXING: NOT AT ALL
GAD7 TOTAL SCORE: 4
1. FEELING NERVOUS, ANXIOUS, OR ON EDGE: SEVERAL DAYS
7. FEELING AFRAID AS IF SOMETHING AWFUL MIGHT HAPPEN: SEVERAL DAYS

## 2022-01-31 ASSESSMENT — PAIN SCALES - GENERAL: PAINLEVEL: NO PAIN (0)

## 2022-01-31 ASSESSMENT — PATIENT HEALTH QUESTIONNAIRE - PHQ9
SUM OF ALL RESPONSES TO PHQ QUESTIONS 1-9: 5
10. IF YOU CHECKED OFF ANY PROBLEMS, HOW DIFFICULT HAVE THESE PROBLEMS MADE IT FOR YOU TO DO YOUR WORK, TAKE CARE OF THINGS AT HOME, OR GET ALONG WITH OTHER PEOPLE: SOMEWHAT DIFFICULT
SUM OF ALL RESPONSES TO PHQ QUESTIONS 1-9: 5

## 2022-01-31 NOTE — NURSING NOTE
"Chief Complaint   Patient presents with     Hypertension       Initial BP (!) 164/110   Pulse 70   Temp (!) 96.1  F (35.6  C) (Tympanic)   Wt 58.5 kg (129 lb)   SpO2 98%   BMI 23.41 kg/m   Estimated body mass index is 23.41 kg/m  as calculated from the following:    Height as of 12/15/21: 1.581 m (5' 2.25\").    Weight as of this encounter: 58.5 kg (129 lb).  Medication Reconciliation: complete    SAAD Fabian MA    "

## 2022-01-31 NOTE — PROGRESS NOTES
Assessment & Plan     Essential hypertension  Add losartan 50 mg daily.   Side effects and how to take the medication discussed.  Recheck in 1 month, sooner if needed.   - losartan (COZAAR) 50 MG tablet; Take 1 tablet (50 mg) by mouth daily    Mild recurrent major depression (H)  Stable, refill given   - citalopram (CELEXA) 20 MG tablet; 1 tablet daily      25 minutes spent on the date of the encounter doing patient visit and documentation            Return in about 1 month (around 2/28/2022) for BP Recheck.    Kristen M. Kehr, PA-C  [facility]   Sarah is a 51 year old who presents for the following health issues     Sarah feel that the anxiety and depression symptoms are better with the use of the citalopram.   She also had a vacation recently and realized that her work is a big contributor to the stress and anxiety.   Her blood pressure remains elevated.     History of Present Illness       Mental Health Follow-up:  Patient presents to follow-up on Depression & Anxiety.Patient's depression since last visit has been:  Medium  The patient is not having other symptoms associated with depression.  Patient's anxiety since last visit has been:  Medium  The patient is not having other symptoms associated with anxiety.  Any significant life events: other  Patient is feeling anxious or having panic attacks.  Patient has no concerns about alcohol or drug use.     Social History  Tobacco Use    Smoking status: Never Smoker    Smokeless tobacco: Never Used  Vaping Use    Vaping Use: Never used  Alcohol use: Yes    Comment: occasionally; socially  Drug use: No      Today's PHQ-9         PHQ-9 Total Score:     (P) 5   PHQ-9 Q9 Thoughts of better off dead/self-harm past 2 weeks :   (P) Not at all   Thoughts of suicide or self harm:      Self-harm Plan:        Self-harm Action:          Safety concerns for self or others:           Hypertension: She presents for follow up of hypertension.  She  does not check blood pressure  regularly outside of the clinic. Outside blood pressures have been over 140/90. She does not follow a low salt diet.     She eats 2-3 servings of fruits and vegetables daily.She consumes 0 sweetened beverage(s) daily.She exercises with enough effort to increase her heart rate 30 to 60 minutes per day.  She exercises with enough effort to increase her heart rate 4 days per week.   She is taking medications regularly.             Review of Systems   Constitutional, HEENT, cardiovascular, pulmonary, GI, , musculoskeletal, neuro, skin, endocrine and psych systems are negative, except as otherwise noted.      Objective    BP (!) 150/102   Pulse 70   Temp (!) 96.1  F (35.6  C) (Tympanic)   Wt 58.5 kg (129 lb)   SpO2 98%   BMI 23.41 kg/m    Body mass index is 23.41 kg/m .  Physical Exam   GENERAL: healthy, alert and no distress  PSYCH: mentation appears normal, affect normal/bright, judgement and insight intact and appearance well groomed

## 2022-02-01 ASSESSMENT — ANXIETY QUESTIONNAIRES: GAD7 TOTAL SCORE: 4

## 2022-02-01 ASSESSMENT — PATIENT HEALTH QUESTIONNAIRE - PHQ9: SUM OF ALL RESPONSES TO PHQ QUESTIONS 1-9: 5

## 2022-02-02 ENCOUNTER — ANCILLARY PROCEDURE (OUTPATIENT)
Dept: MAMMOGRAPHY | Facility: CLINIC | Age: 52
End: 2022-02-02
Attending: PHYSICIAN ASSISTANT
Payer: COMMERCIAL

## 2022-02-02 DIAGNOSIS — Z12.31 VISIT FOR SCREENING MAMMOGRAM: ICD-10-CM

## 2022-02-02 PROCEDURE — 77067 SCR MAMMO BI INCL CAD: CPT | Mod: GC | Performed by: RADIOLOGY

## 2022-02-08 ENCOUNTER — ANCILLARY PROCEDURE (OUTPATIENT)
Dept: MAMMOGRAPHY | Facility: CLINIC | Age: 52
End: 2022-02-08
Attending: PHYSICIAN ASSISTANT
Payer: COMMERCIAL

## 2022-02-08 ENCOUNTER — ANCILLARY PROCEDURE (OUTPATIENT)
Dept: ULTRASOUND IMAGING | Facility: CLINIC | Age: 52
End: 2022-02-08
Attending: PHYSICIAN ASSISTANT
Payer: COMMERCIAL

## 2022-02-08 DIAGNOSIS — R92.8 ABNORMAL MAMMOGRAM: ICD-10-CM

## 2022-02-08 PROCEDURE — 76642 ULTRASOUND BREAST LIMITED: CPT | Mod: LT | Performed by: RADIOLOGY

## 2022-02-08 PROCEDURE — 77065 DX MAMMO INCL CAD UNI: CPT | Mod: LT | Performed by: RADIOLOGY

## 2022-02-08 PROCEDURE — 77061 BREAST TOMOSYNTHESIS UNI: CPT | Mod: LT | Performed by: RADIOLOGY

## 2022-02-14 DIAGNOSIS — F33.0 MILD RECURRENT MAJOR DEPRESSION (H): ICD-10-CM

## 2022-02-15 RX ORDER — CITALOPRAM HYDROBROMIDE 20 MG/1
TABLET ORAL
Qty: 90 TABLET | Refills: 3 | Status: SHIPPED | OUTPATIENT
Start: 2022-02-15 | End: 2022-05-02

## 2022-02-15 NOTE — TELEPHONE ENCOUNTER
"Routing refill request to provider for review/approval because:  Requested Prescriptions   Pending Prescriptions Disp Refills    citalopram (CELEXA) 20 MG tablet [Pharmacy Med Name: CITALOPRAM 20MG TABLETS] 30 tablet      Sig: TAKE 1/2 TABLET DAILY X 6 DAYS, THEN INCREASE TO 1 TABLET BY MOUTH DAILY.        SSRIs Protocol Failed - 2/14/2022  9:02 PM        Failed - PHQ-9 score less than 5 in past 6 months     Please review last PHQ-9 score.           Passed - Medication is active on med list        Passed - Patient is age 18 or older        Passed - No active pregnancy on record        Passed - No positive pregnancy test in last 12 months        Passed - Recent (6 mo) or future (30 days) visit within the authorizing provider's specialty     Patient had office visit in the last 6 months or has a visit in the next 30 days with authorizing provider or within the authorizing provider's specialty.  See \"Patient Info\" tab in inbasket, or \"Choose Columns\" in Meds & Orders section of the refill encounter.                PHQ 12/15/2021 1/31/2022   PHQ-9 Total Score 8 5   Q9: Thoughts of better off dead/self-harm past 2 weeks Not at all Not at all         Sarah SANCHEZN, RN        "

## 2022-02-28 ENCOUNTER — OFFICE VISIT (OUTPATIENT)
Dept: FAMILY MEDICINE | Facility: CLINIC | Age: 52
End: 2022-02-28
Payer: COMMERCIAL

## 2022-02-28 VITALS
OXYGEN SATURATION: 99 % | WEIGHT: 132 LBS | DIASTOLIC BLOOD PRESSURE: 98 MMHG | HEART RATE: 71 BPM | TEMPERATURE: 97 F | BODY MASS INDEX: 23.95 KG/M2 | SYSTOLIC BLOOD PRESSURE: 142 MMHG

## 2022-02-28 DIAGNOSIS — I10 ESSENTIAL HYPERTENSION: Primary | ICD-10-CM

## 2022-02-28 PROCEDURE — 99213 OFFICE O/P EST LOW 20 MIN: CPT | Performed by: PHYSICIAN ASSISTANT

## 2022-02-28 RX ORDER — HYDROCHLOROTHIAZIDE 25 MG/1
25 TABLET ORAL DAILY
Qty: 60 TABLET | Refills: 1 | Status: SHIPPED | OUTPATIENT
Start: 2022-02-28 | End: 2022-05-02

## 2022-02-28 ASSESSMENT — PAIN SCALES - GENERAL: PAINLEVEL: NO PAIN (0)

## 2022-02-28 NOTE — PROGRESS NOTES
Assessment & Plan     Essential hypertension  Stop the losartan  Start hydrochlorothiazide 25 mg daily  Recheck in 2 months   Continue to monitor at home.   Side effects and how to take the medication discussed.  - hydrochlorothiazide (HYDRODIURIL) 25 MG tablet; Take 1 tablet (25 mg) by mouth daily                 Return in about 2 months (around 4/28/2022) for BP Recheck.    Kristen M. Kehr, PA-C [facility] LUIZ Smith is a 51 year old who presents for the following health issues     HPI     Hypertension Follow-up      Do you check your blood pressure regularly outside of the clinic?      Are you following a low salt diet?     Are your blood pressures ever more than 140 on the top number (systolic) OR more   than 90 on the bottom number (diastolic), for example 140/90?     Not responding to the losartan 50 mg daily.       Review of Systems   Constitutional, HEENT, cardiovascular, pulmonary, GI, , musculoskeletal, neuro, skin, endocrine and psych systems are negative, except as otherwise noted.      Objective    BP (!) 150/100   Pulse 71   Temp 97  F (36.1  C) (Tympanic)   Wt 59.9 kg (132 lb)   SpO2 99%   BMI 23.95 kg/m    Body mass index is 23.95 kg/m .  Physical Exam   GENERAL: healthy, alert and no distress  PSYCH: mentation appears normal, affect normal/bright

## 2022-02-28 NOTE — NURSING NOTE
"Chief Complaint   Patient presents with     Hypertension       Initial BP (!) 150/100   Pulse 71   Temp 97  F (36.1  C) (Tympanic)   Wt 59.9 kg (132 lb)   SpO2 99%   BMI 23.95 kg/m   Estimated body mass index is 23.95 kg/m  as calculated from the following:    Height as of 12/15/21: 1.581 m (5' 2.25\").    Weight as of this encounter: 59.9 kg (132 lb).  Medication Reconciliation: complete    SAAD Fabian MA    "

## 2022-04-26 RX ORDER — TERBINAFINE HYDROCHLORIDE 250 MG/1
TABLET ORAL
Qty: 90 TABLET | OUTPATIENT
Start: 2022-04-26

## 2022-04-26 NOTE — TELEPHONE ENCOUNTER
She should be done taking this prescription. It was given in Dec 2021 and she should be done.   Kristen Kehr PA-C

## 2022-04-27 DIAGNOSIS — G43.009 MIGRAINE WITHOUT AURA AND WITHOUT STATUS MIGRAINOSUS, NOT INTRACTABLE: ICD-10-CM

## 2022-04-27 NOTE — TELEPHONE ENCOUNTER
"Routing refill request to provider for review/approval because:  Requested Prescriptions   Pending Prescriptions Disp Refills    rizatriptan (MAXALT-MLT) 10 MG ODT [Pharmacy Med Name: RIZATRIPTAN ODT 10MG TABLETS] 12 tablet 11     Sig: DISSOLVE 1 TABLET BY MOUTH AT ONSET OF HEADACHE FOR MIGRAINE. MAY REPEAT IN 2 HOURS. MAX 3/24 HOURS.        Serotonin Agonists Failed - 4/27/2022  5:11 PM        Failed - Blood pressure under 140/90 in past 12 months       BP Readings from Last 3 Encounters:   02/28/22 (!) 142/98   01/31/22 (!) 150/102   12/15/21 (!) 144/94                 Failed - Serotonin Agonist request needs review.     Please review patient's record. If patient has had 8 or more treatments in the past month, please forward to provider.            Passed - Recent (12 mo) or future (30 days) visit within the authorizing provider's specialty     Patient has had an office visit with the authorizing provider or a provider within the authorizing providers department within the previous 12 mos or has a future within next 30 days. See \"Patient Info\" tab in inbasket, or \"Choose Columns\" in Meds & Orders section of the refill encounter.              Passed - Medication is active on med list        Passed - Patient is age 18 or older        Passed - No active pregnancy on record        Passed - No positive pregnancy test in past 12 months                Sarah OLIVEIRA, RN        "

## 2022-04-28 RX ORDER — RIZATRIPTAN BENZOATE 10 MG/1
TABLET, ORALLY DISINTEGRATING ORAL
Qty: 12 TABLET | Refills: 11 | Status: SHIPPED | OUTPATIENT
Start: 2022-04-28 | End: 2023-02-14

## 2022-05-02 ENCOUNTER — OFFICE VISIT (OUTPATIENT)
Dept: FAMILY MEDICINE | Facility: CLINIC | Age: 52
End: 2022-05-02
Payer: COMMERCIAL

## 2022-05-02 VITALS
WEIGHT: 132 LBS | SYSTOLIC BLOOD PRESSURE: 122 MMHG | OXYGEN SATURATION: 99 % | BODY MASS INDEX: 23.95 KG/M2 | HEART RATE: 80 BPM | TEMPERATURE: 97.7 F | DIASTOLIC BLOOD PRESSURE: 80 MMHG

## 2022-05-02 DIAGNOSIS — I10 ESSENTIAL HYPERTENSION: ICD-10-CM

## 2022-05-02 PROCEDURE — 80048 BASIC METABOLIC PNL TOTAL CA: CPT | Performed by: PHYSICIAN ASSISTANT

## 2022-05-02 PROCEDURE — 99213 OFFICE O/P EST LOW 20 MIN: CPT | Performed by: PHYSICIAN ASSISTANT

## 2022-05-02 PROCEDURE — 36415 COLL VENOUS BLD VENIPUNCTURE: CPT | Performed by: PHYSICIAN ASSISTANT

## 2022-05-02 RX ORDER — HYDROCHLOROTHIAZIDE 25 MG/1
25 TABLET ORAL DAILY
Qty: 90 TABLET | Refills: 3 | Status: SHIPPED | OUTPATIENT
Start: 2022-05-02 | End: 2023-02-14

## 2022-05-02 ASSESSMENT — PAIN SCALES - GENERAL: PAINLEVEL: NO PAIN (0)

## 2022-05-02 NOTE — NURSING NOTE
"Chief Complaint   Patient presents with     Hypertension       Initial BP (!) 142/88   Pulse 80   Temp 97.7  F (36.5  C) (Tympanic)   Wt 59.9 kg (132 lb)   SpO2 99%   BMI 23.95 kg/m   Estimated body mass index is 23.95 kg/m  as calculated from the following:    Height as of 12/15/21: 1.581 m (5' 2.25\").    Weight as of this encounter: 59.9 kg (132 lb).  Medication Reconciliation: complete    SAAD Fabian MA    "

## 2022-05-02 NOTE — PROGRESS NOTES
Assessment & Plan     Essential hypertension  She has been on the hydrochlorothiazide x 2 months.   Recheck BMP today.   BP is at goal.   Recheck in 1 year at her routine appointment  - hydrochlorothiazide (HYDRODIURIL) 25 MG tablet; Take 1 tablet (25 mg) by mouth daily  - Basic metabolic panel  (Ca, Cl, CO2, Creat, Gluc, K, Na, BUN); Future                 Return in about 1 year (around 5/2/2023) for Routine Visit.    Kristen M. Kehr, PA-C [facility]    Lauren Smith is a 52 year old who presents for the following health issues     History of Present Illness       Hypertension: She presents for follow up of hypertension.  She does not check blood pressure  regularly outside of the clinic. Outpatient blood pressures have not been over 140/90. She does not follow a low salt diet.     She eats 0-1 servings of fruits and vegetables daily.She consumes 0 sweetened beverage(s) daily.She exercises with enough effort to increase her heart rate 30 to 60 minutes per day.  She exercises with enough effort to increase her heart rate 4 days per week.   She is taking medications regularly.             Review of Systems   Constitutional, HEENT, cardiovascular, pulmonary, GI, , musculoskeletal, neuro, skin, endocrine and psych systems are negative, except as otherwise noted.      Objective    /80   Pulse 80   Temp 97.7  F (36.5  C) (Tympanic)   Wt 59.9 kg (132 lb)   SpO2 99%   BMI 23.95 kg/m    Body mass index is 23.95 kg/m .  Physical Exam   GENERAL: healthy, alert and no distress  PSYCH: mentation appears normal, affect normal/bright

## 2022-05-03 LAB
ANION GAP SERPL CALCULATED.3IONS-SCNC: 9 MMOL/L (ref 3–14)
BUN SERPL-MCNC: 14 MG/DL (ref 7–30)
CALCIUM SERPL-MCNC: 8.7 MG/DL (ref 8.5–10.1)
CHLORIDE BLD-SCNC: 99 MMOL/L (ref 94–109)
CO2 SERPL-SCNC: 27 MMOL/L (ref 20–32)
CREAT SERPL-MCNC: 0.86 MG/DL (ref 0.52–1.04)
GFR SERPL CREATININE-BSD FRML MDRD: 81 ML/MIN/1.73M2
GLUCOSE BLD-MCNC: 89 MG/DL (ref 70–99)
POTASSIUM BLD-SCNC: 3.2 MMOL/L (ref 3.4–5.3)
SODIUM SERPL-SCNC: 135 MMOL/L (ref 133–144)

## 2022-09-25 ENCOUNTER — OFFICE VISIT (OUTPATIENT)
Dept: URGENT CARE | Facility: URGENT CARE | Age: 52
End: 2022-09-25
Payer: COMMERCIAL

## 2022-09-25 VITALS
TEMPERATURE: 97.1 F | SYSTOLIC BLOOD PRESSURE: 137 MMHG | RESPIRATION RATE: 16 BRPM | DIASTOLIC BLOOD PRESSURE: 85 MMHG | OXYGEN SATURATION: 99 % | HEART RATE: 85 BPM

## 2022-09-25 DIAGNOSIS — N30.01 ACUTE CYSTITIS WITH HEMATURIA: Primary | ICD-10-CM

## 2022-09-25 DIAGNOSIS — R30.0 DYSURIA: ICD-10-CM

## 2022-09-25 LAB
ALBUMIN UR-MCNC: NEGATIVE MG/DL
APPEARANCE UR: ABNORMAL
BACTERIA #/AREA URNS HPF: ABNORMAL /HPF
BILIRUB UR QL STRIP: NEGATIVE
COLOR UR AUTO: YELLOW
GLUCOSE UR STRIP-MCNC: NEGATIVE MG/DL
HGB UR QL STRIP: ABNORMAL
KETONES UR STRIP-MCNC: NEGATIVE MG/DL
LEUKOCYTE ESTERASE UR QL STRIP: ABNORMAL
NITRATE UR QL: NEGATIVE
PH UR STRIP: 5.5 [PH] (ref 5–7)
RBC #/AREA URNS AUTO: ABNORMAL /HPF
SP GR UR STRIP: 1.01 (ref 1–1.03)
SQUAMOUS #/AREA URNS AUTO: ABNORMAL /LPF
UROBILINOGEN UR STRIP-ACNC: 0.2 E.U./DL
WBC #/AREA URNS AUTO: >100 /HPF

## 2022-09-25 PROCEDURE — 99213 OFFICE O/P EST LOW 20 MIN: CPT | Performed by: NURSE PRACTITIONER

## 2022-09-25 PROCEDURE — 81001 URINALYSIS AUTO W/SCOPE: CPT | Performed by: NURSE PRACTITIONER

## 2022-09-25 PROCEDURE — 87086 URINE CULTURE/COLONY COUNT: CPT | Performed by: NURSE PRACTITIONER

## 2022-09-25 PROCEDURE — 87186 SC STD MICRODIL/AGAR DIL: CPT | Performed by: NURSE PRACTITIONER

## 2022-09-25 RX ORDER — SULFAMETHOXAZOLE/TRIMETHOPRIM 800-160 MG
1 TABLET ORAL 2 TIMES DAILY
Qty: 6 TABLET | Refills: 0 | Status: SHIPPED | OUTPATIENT
Start: 2022-09-25 | End: 2022-09-28

## 2022-09-25 NOTE — PROGRESS NOTES
SUBJECTIVE:   Sarah Padilla is a 52 year old female who  presents today for a possible UTI. Symptoms of dysuria and frequency have been going on for 1day(s).  Hematuria yes .  gradual onsetand mild.  There is no history of fever, chills, nausea or vomiting.  This patient does have a history of urinary tract infections. Patient denies vaginal symptoms,     Past Medical History:   Diagnosis Date     Daily headache 10/2/2016     Daily headache      Daily headache      Inflammatory arthritis 4/4/2012     NO ACTIVE PROBLEMS      Current Outpatient Medications   Medication Sig Dispense Refill     Doxylamine Succinate, Sleep, (UNISOM PO) Take 1 tablet by mouth nightly as needed.       ferrous sulfate (IRON) 325 (65 FE) MG tablet Take 1 tablet (325 mg) by mouth 2 times daily 180 tablet 3     hydrochlorothiazide (HYDRODIURIL) 25 MG tablet Take 1 tablet (25 mg) by mouth daily 90 tablet 3     ibuprofen (ADVIL/MOTRIN) 200 MG tablet Take 200 mg by mouth every 4 hours as needed.       rizatriptan (MAXALT-MLT) 10 MG ODT DISSOLVE 1 TABLET BY MOUTH AT ONSET OF HEADACHE FOR MIGRAINE. MAY REPEAT IN 2 HOURS. MAX 3/24 HOURS. 12 tablet 11         ROS:   Review of systems negative except as stated above.    OBJECTIVE:  /85   Pulse 85   Temp 97.1  F (36.2  C) (Tympanic)   Resp 16   SpO2 99%   GENERAL APPEARANCE: healthy, alert and no distress  RESP: lungs clear to auscultation - no rales, rhonchi or wheezes  CV: regular rates and rhythm, normal S1 S2, no murmur noted  ABDOMEN:  soft, nontender, no HSM or masses and bowel sounds normal  BACK: No CVA tenderness  SKIN: no suspicious lesions or rashes    UA:   Results for orders placed or performed in visit on 09/25/22   UA reflex to Microscopic and Culture     Status: Abnormal    Specimen: Urine, Midstream   Result Value Ref Range    Color Urine Yellow Colorless, Straw, Light Yellow, Yellow    Appearance Urine Slightly Cloudy (A) Clear    Glucose Urine Negative Negative mg/dL     Bilirubin Urine Negative Negative    Ketones Urine Negative Negative mg/dL    Specific Gravity Urine 1.010 1.003 - 1.035    Blood Urine Large (A) Negative    pH Urine 5.5 5.0 - 7.0    Protein Albumin Urine Negative Negative mg/dL    Urobilinogen Urine 0.2 0.2, 1.0 E.U./dL    Nitrite Urine Negative Negative    Leukocyte Esterase Urine Moderate (A) Negative   Urine Microscopic Exam     Status: Abnormal   Result Value Ref Range    Bacteria Urine Few (A) None Seen /HPF    RBC Urine 10-25 (A) 0-2 /HPF /HPF    WBC Urine >100 (A) 0-5 /HPF /HPF    Squamous Epithelials Urine Few (A) None Seen /LPF         ASSESSMENT:     ICD-10-CM    1. Acute cystitis with hematuria  N30.01 sulfamethoxazole-trimethoprim (BACTRIM DS) 800-160 MG tablet   2. Dysuria  R30.0 UA reflex to Microscopic and Culture     UA reflex to Microscopic and Culture     Urine Microscopic Exam     Urine Culture         PLAN:  Drink plenty of fluids.  Prevention and treatment of UTI's discussed.  Signs and symptoms of pyelonephritis mentioned.   RTC if any worsening symptoms or if not improving as expected.    JIN Jimenez CNP

## 2022-09-27 LAB — BACTERIA UR CULT: ABNORMAL

## 2022-11-01 ENCOUNTER — E-VISIT (OUTPATIENT)
Dept: URGENT CARE | Facility: CLINIC | Age: 52
End: 2022-11-01
Payer: COMMERCIAL

## 2022-11-01 DIAGNOSIS — N39.0 ACUTE UTI (URINARY TRACT INFECTION): Primary | ICD-10-CM

## 2022-11-01 PROCEDURE — 99421 OL DIG E/M SVC 5-10 MIN: CPT | Performed by: EMERGENCY MEDICINE

## 2022-11-01 RX ORDER — SULFAMETHOXAZOLE/TRIMETHOPRIM 800-160 MG
1 TABLET ORAL 2 TIMES DAILY
Qty: 10 TABLET | Refills: 0 | Status: SHIPPED | OUTPATIENT
Start: 2022-11-01 | End: 2022-11-06

## 2022-11-01 NOTE — PATIENT INSTRUCTIONS
Dear Sarah Padilla    After reviewing your responses, I've been able to diagnose you with a urinary tract infection, which is a common infection of the bladder with bacteria.  This is not a sexually transmitted infection, though urinating immediately after intercourse can help prevent infections.  Drinking lots of fluids is also helpful to clear your current infection and prevent the next one.      I have sent a prescription for antibiotics to your pharmacy to treat this infection.    It is important that you take all of your prescribed medication even if your symptoms are improving after a few doses.  Taking all of your medicine helps prevent the symptoms from returning.     If your symptoms worsen, you develop pain in your back or stomach, develop fevers, or are not improving in 5 days, please contact your primary care provider for an appointment or visit any of our convenient Walk-in or Urgent Care Centers to be seen, which can be found on our website here.    Thanks again for choosing us as your health care partner,    Loi Dejesus MD    Urinary Tract Infections in Women  Urinary tract infections (UTIs) are most often caused by bacteria. These bacteria enter the urinary tract. The bacteria may come from inside the body. Or they may travel from the skin outside the rectum or vagina into the urethra. Female anatomy makes it easy for bacteria from the bowel to enter a woman s urinary tract, which is the most common source of UTI. This means women develop UTIs more often than men. Pain in or around the urinary tract is a common UTI symptom. But the only way to know for sure if you have a UTI for the healthcare provider to test your urine. The two tests that may be done are the urinalysis and urine culture.     Types of UTIs    Cystitis. A bladder infection (cystitis) is the most common UTI in women. You may have urgent or frequent need to pee. You may also have pain, burning when you pee, and bloody  urine.    Urethritis. This is an inflamed urethra, which is the tube that carries urine from the bladder to outside the body. You may have lower stomach or back pain. You may also have urgent or frequent need to pee.    Pyelonephritis. This is a kidney infection. If not treated, it can be serious and damage your kidneys. In severe cases, you may need to stay in the hospital. You may have a fever and lower back pain.    Medicines to treat a UTI  Most UTIs are treated with antibiotics. These kill the bacteria. The length of time you need to take them depends on the type of infection. It may be as short as 3 days. If you have repeated UTIs, you may need a low-dose antibiotic for several months. Take antibiotics exactly as directed. Don t stop taking them until all of the medicine is gone. If you stop taking the antibiotic too soon, the infection may not go away. You may also develop a resistance to the antibiotic. This can make it much harder to treat.   Lifestyle changes to treat and prevent UTIs   The lifestyle changes below will help get rid of your UTI. They may also help prevent future UTIs.     Drink plenty of fluids. This includes water, juice, or other caffeine-free drinks. Fluids help flush bacteria out of your body.    Empty your bladder. Always empty your bladder when you feel the urge to pee. And always pee before going to sleep. Urine that stays in your bladder can lead to infection. Try to pee before and after sex as well.    Practice good personal hygiene. Wipe yourself from front to back after using the toilet. This helps keep bacteria from getting into the urethra.    Use condoms during sex. These help prevent UTIs caused by sexually transmitted bacteria. Also don't use spermicides during sex. These can increase the risk for UTIs. Choose other forms of birth control instead. For women who tend to get UTIs after sex, a low-dose of a preventive antibiotic may be used. Be sure to discuss this option with  your healthcare provider.    Follow up with your healthcare provider as directed. He or she may test to make sure the infection has cleared. If needed, more treatment may be started.  Maximiliano last reviewed this educational content on 7/1/2019 2000-2021 [employer]. All rights reserved. This information is not intended as a substitute for professional medical care. Always follow your healthcare professional's instructions.

## 2022-11-21 ENCOUNTER — HEALTH MAINTENANCE LETTER (OUTPATIENT)
Age: 52
End: 2022-11-21

## 2023-02-07 ASSESSMENT — ENCOUNTER SYMPTOMS
MYALGIAS: 0
NAUSEA: 0
EYE PAIN: 0
DYSURIA: 0
WEAKNESS: 0
JOINT SWELLING: 0
CHILLS: 0
FREQUENCY: 0
ARTHRALGIAS: 0
HEARTBURN: 0
HEADACHES: 0
NERVOUS/ANXIOUS: 0
HEMATOCHEZIA: 0
SHORTNESS OF BREATH: 0
PARESTHESIAS: 0
ABDOMINAL PAIN: 0
HEMATURIA: 0
COUGH: 0
DIARRHEA: 0
PALPITATIONS: 0
FEVER: 0
BREAST MASS: 0
CONSTIPATION: 0
DIZZINESS: 0
SORE THROAT: 0

## 2023-02-14 ENCOUNTER — OFFICE VISIT (OUTPATIENT)
Dept: FAMILY MEDICINE | Facility: CLINIC | Age: 53
End: 2023-02-14
Payer: COMMERCIAL

## 2023-02-14 VITALS
HEART RATE: 79 BPM | WEIGHT: 136 LBS | SYSTOLIC BLOOD PRESSURE: 122 MMHG | HEIGHT: 63 IN | OXYGEN SATURATION: 97 % | BODY MASS INDEX: 24.1 KG/M2 | DIASTOLIC BLOOD PRESSURE: 82 MMHG | TEMPERATURE: 97.3 F

## 2023-02-14 DIAGNOSIS — G43.009 MIGRAINE WITHOUT AURA AND WITHOUT STATUS MIGRAINOSUS, NOT INTRACTABLE: ICD-10-CM

## 2023-02-14 DIAGNOSIS — N39.0 RECURRENT UTI: ICD-10-CM

## 2023-02-14 DIAGNOSIS — Z00.00 ROUTINE GENERAL MEDICAL EXAMINATION AT A HEALTH CARE FACILITY: Primary | ICD-10-CM

## 2023-02-14 DIAGNOSIS — B35.1 ONYCHOMYCOSIS: ICD-10-CM

## 2023-02-14 DIAGNOSIS — I10 ESSENTIAL HYPERTENSION: ICD-10-CM

## 2023-02-14 PROBLEM — F33.0 MILD RECURRENT MAJOR DEPRESSION (H): Status: RESOLVED | Noted: 2021-12-15 | Resolved: 2023-02-14

## 2023-02-14 PROCEDURE — 36415 COLL VENOUS BLD VENIPUNCTURE: CPT | Performed by: PHYSICIAN ASSISTANT

## 2023-02-14 PROCEDURE — 99214 OFFICE O/P EST MOD 30 MIN: CPT | Mod: 25 | Performed by: PHYSICIAN ASSISTANT

## 2023-02-14 PROCEDURE — 99396 PREV VISIT EST AGE 40-64: CPT | Performed by: PHYSICIAN ASSISTANT

## 2023-02-14 PROCEDURE — G0145 SCR C/V CYTO,THINLAYER,RESCR: HCPCS | Performed by: PHYSICIAN ASSISTANT

## 2023-02-14 PROCEDURE — 80048 BASIC METABOLIC PNL TOTAL CA: CPT | Performed by: PHYSICIAN ASSISTANT

## 2023-02-14 PROCEDURE — 87624 HPV HI-RISK TYP POOLED RSLT: CPT | Performed by: PHYSICIAN ASSISTANT

## 2023-02-14 RX ORDER — NITROFURANTOIN MACROCRYSTALS 50 MG/1
CAPSULE ORAL
Qty: 30 CAPSULE | Refills: 3 | Status: SHIPPED | OUTPATIENT
Start: 2023-02-14 | End: 2023-09-27

## 2023-02-14 RX ORDER — TERBINAFINE HYDROCHLORIDE 250 MG/1
250 TABLET ORAL DAILY
Qty: 90 TABLET | Refills: 0 | Status: SHIPPED | OUTPATIENT
Start: 2023-02-14 | End: 2023-05-15

## 2023-02-14 RX ORDER — RIZATRIPTAN BENZOATE 10 MG/1
TABLET, ORALLY DISINTEGRATING ORAL
Qty: 12 TABLET | Refills: 11 | Status: SHIPPED | OUTPATIENT
Start: 2023-02-14 | End: 2024-04-01

## 2023-02-14 RX ORDER — HYDROCHLOROTHIAZIDE 25 MG/1
25 TABLET ORAL DAILY
Qty: 90 TABLET | Refills: 3 | Status: SHIPPED | OUTPATIENT
Start: 2023-02-14 | End: 2024-02-26

## 2023-02-14 ASSESSMENT — ENCOUNTER SYMPTOMS
COUGH: 0
FREQUENCY: 0
ABDOMINAL PAIN: 0
HEARTBURN: 0
WEAKNESS: 0
FEVER: 0
DYSURIA: 0
HEMATURIA: 0
NERVOUS/ANXIOUS: 0
DIARRHEA: 0
NAUSEA: 0
EYE PAIN: 0
MYALGIAS: 0
SORE THROAT: 0
CONSTIPATION: 0
HEADACHES: 0
JOINT SWELLING: 0
DIZZINESS: 0
HEMATOCHEZIA: 0
CHILLS: 0
BREAST MASS: 0
ARTHRALGIAS: 0
SHORTNESS OF BREATH: 0
PARESTHESIAS: 0
PALPITATIONS: 0

## 2023-02-14 ASSESSMENT — ANXIETY QUESTIONNAIRES
1. FEELING NERVOUS, ANXIOUS, OR ON EDGE: NOT AT ALL
6. BECOMING EASILY ANNOYED OR IRRITABLE: NOT AT ALL
5. BEING SO RESTLESS THAT IT IS HARD TO SIT STILL: NOT AT ALL
GAD7 TOTAL SCORE: 0
3. WORRYING TOO MUCH ABOUT DIFFERENT THINGS: NOT AT ALL
GAD7 TOTAL SCORE: 0
2. NOT BEING ABLE TO STOP OR CONTROL WORRYING: NOT AT ALL
7. FEELING AFRAID AS IF SOMETHING AWFUL MIGHT HAPPEN: NOT AT ALL

## 2023-02-14 ASSESSMENT — PAIN SCALES - GENERAL: PAINLEVEL: NO PAIN (0)

## 2023-02-14 ASSESSMENT — PATIENT HEALTH QUESTIONNAIRE - PHQ9
5. POOR APPETITE OR OVEREATING: NOT AT ALL
SUM OF ALL RESPONSES TO PHQ QUESTIONS 1-9: 0

## 2023-02-14 NOTE — NURSING NOTE
"Chief Complaint   Patient presents with     Physical       Initial BP (!) 120/94   Pulse 79   Temp 97.3  F (36.3  C) (Tympanic)   Ht 1.594 m (5' 2.75\")   Wt 61.7 kg (136 lb)   SpO2 97%   BMI 24.28 kg/m   Estimated body mass index is 24.28 kg/m  as calculated from the following:    Height as of this encounter: 1.594 m (5' 2.75\").    Weight as of this encounter: 61.7 kg (136 lb).  Medication Reconciliation: complete    SAAD Fabian MA    "

## 2023-02-14 NOTE — PROGRESS NOTES
SUBJECTIVE:   CC: Sarah is an 52 year old who presents for preventive health visit.   Patient has been advised of split billing requirements and indicates understanding: Yes  Healthy Habits:     Getting at least 3 servings of Calcium per day:  Yes    Bi-annual eye exam:  Yes    Dental care twice a year:  Yes    Sleep apnea or symptoms of sleep apnea:  None    Diet:  Regular (no restrictions)    Frequency of exercise:  None    Taking medications regularly:  Yes    Medication side effects:  None    PHQ-2 Total Score: 0    Additional concerns today:  Yes    PROBLEMS TO ADD:  1 migraine: she will need refills of the maxalt  2. Hypertension: managed with hydrochlorothiazide 25 mg daily  3. Onychomycosis: refill of the lamisil needed, the infection returned a few months after her last treatment  4. Recurrent UTI: she has had around 8 infections in the past year. Very responsive to antibiotic treatments. She [facility]. No current symptoms. Often occurs after intercourse    Today's PHQ-2 Score:   PHQ-2 ( 1999 [employer]) 2/7/2023   Q1: Little interest or pleasure in doing things 0   Q2: Feeling down, depressed or hopeless 0   PHQ-2 Score 0   PHQ-2 Total Score (12-17 Years)- Positive if 3 or more points; Administer PHQ-A if positive -   Q1: Little interest or pleasure in doing things Not at all   Q2: Feeling down, depressed or hopeless Not at all   PHQ-2 Score 0           Social History     Tobacco Use     Smoking status: Never     Smokeless tobacco: Never   Substance Use Topics     Alcohol use: Yes     Comment: occasionally; socially     If you drink alcohol do you typically have >3 drinks per day or >7 drinks per week? No    No flowsheet data found.    Reviewed orders with patient.  Reviewed health maintenance and updated orders accordingly - Yes  BP Readings from Last 3 Encounters:   02/14/23 122/82   09/25/22 137/85   05/02/22 122/80    Wt Readings from Last 3 Encounters:   02/14/23 61.7 kg (136 lb)    05/02/22 59.9 kg (132 lb)   02/28/22 59.9 kg (132 lb)                  Patient Active Problem List   Diagnosis     CARDIOVASCULAR SCREENING; LDL GOAL LESS THAN 160     Inflammatory arthritis     Other iron deficiency anemia     Migraine without aura and without status migrainosus, not intractable     Daily headache     Bunion     DUB (dysfunctional uterine bleeding)     Onychomycosis     Elevated BP without diagnosis of hypertension     Past Surgical History:   Procedure Laterality Date     BUNIONECTOMY Right 12/31/2019    Procedure: BUNIONECTOMY Right;  Surgeon: Juan J Bustillos DPM;  Location: [facility] OR     No History of Surgery         Social History     Tobacco Use     Smoking status: Never     Smokeless tobacco: Never   Substance Use Topics     Alcohol use: Yes     Comment: occasionally; socially     Family History   Problem Relation Age of Onset     Alzheimer Disease Father      Hypertension Father      Respiratory Maternal Grandmother         Emphysema     Cancer Maternal Grandfather         Lung/Skin         Current Outpatient Medications   Medication Sig Dispense Refill     Doxylamine Succinate, Sleep, (UNISOM PO) Take 1 tablet by mouth nightly as needed.       ferrous sulfate (IRON) 325 (65 FE) MG tablet Take 1 tablet (325 mg) by mouth 2 times daily 180 tablet 3     hydrochlorothiazide (HYDRODIURIL) 25 MG tablet Take 1 tablet (25 mg) by mouth daily 90 tablet 3     ibuprofen (ADVIL/MOTRIN) 200 MG tablet Take 200 mg by mouth every 4 hours as needed.       nitroFURantoin macrocrystal (MACRODANTIN) 50 MG capsule Take 1 tablet at onset of urinary symptoms for one dose for prevention of urinary infection 30 capsule 3     rizatriptan (MAXALT-MLT) 10 MG ODT DISSOLVE 1 TABLET BY MOUTH AT ONSET OF HEADACHE FOR MIGRAINE. MAY REPEAT IN 2 HOURS. MAX 3/24 HOURS. Strength: 10 mg 12 tablet 11     terbinafine (LAMISIL) 250 MG tablet Take 1 tablet (250 mg) by mouth daily for 90 days 90 tablet 0     No Known Allergies  Recent  Labs   Lab Test 22  1757 07/15/17  0957 16  1744 16  0922   LDL  --  49  --  58   HDL  --  74  --  72   TRIG  --  44  --  47   CR 0.86  --   --   --    GFRESTIMATED 81  --   --   --    POTASSIUM 3.2*  --   --   --    TSH  --   --  1.90  --         Breast Cancer Screening:    Breast CA Risk Assessment (FHS-7) 2021   Do you have a family history of breast, colon, or ovarian cancer? No / Unknown         Mammogram Screening: Recommended annual mammography  Pertinent mammograms are reviewed under the imaging tab.    History of abnormal Pap smear:   NO - age 30-65 PAP every 5 years with negative HPV co-testing recommended  Last 3 Pap and HPV Results:   PAP / HPV Latest Ref Rng & Units 2018 2016 3/29/2011   PAP (Historical) - NIL NIL NIL   HPV16 NEG:Negative Negative - -   HPV18 NEG:Negative Negative - -   HRHPV NEG:Negative Negative - -     PAP / HPV Latest Ref Rng & Units 2018 2016 3/29/2011   PAP (Historical) - NIL NIL NIL   HPV16 NEG:Negative Negative - -   HPV18 NEG:Negative Negative - -   HRHPV NEG:Negative Negative - -     Reviewed and updated as needed this visit by clinical staff   Tobacco  Allergies  Meds  Problems  Med Hx  Surg Hx  Fam Hx          Reviewed and updated as needed this visit by Provider   Tobacco  Allergies  Meds  Problems  Med Hx  Surg Hx  Fam Hx         Past Medical History:   Diagnosis Date     Daily headache 10/2/2016     Daily headache      Daily headache      Inflammatory arthritis 2012     NO ACTIVE PROBLEMS       Past Surgical History:   Procedure Laterality Date     BUNIONECTOMY Right 2019    Procedure: BUNIONECTOMY Right;  Surgeon: Juan J Bustillos DPM;  Location:  OR     No History of Surgery       OB History    Para Term  AB Living   4 4 4 0 0 3   SAB IAB Ectopic Multiple Live Births   0 0 0 0 4      # Outcome Date GA Lbr Chava/2nd Weight Sex Delivery Anes PTL Lv   4 Term  41w0d   M    LOI   3  "Term  41w0d   M    LOI   2 Term  41w0d   F    DEC    Term  41w0d   M    [facility]       Review of Systems   Constitutional: Negative for chills and fever.   HENT: Negative for congestion, ear pain, hearing loss and sore throat.    Eyes: Negative for pain and visual disturbance.   Respiratory: Negative for cough and shortness of breath.    Cardiovascular: Negative for chest pain, palpitations and peripheral edema.   Gastrointestinal: Negative for abdominal pain, constipation, diarrhea, heartburn, hematochezia and nausea.   Breasts:  Negative for tenderness, breast mass and discharge.   Genitourinary: Negative for dysuria, frequency, genital sores, hematuria, pelvic pain, urgency, vaginal bleeding and vaginal discharge.   Musculoskeletal: Negative for arthralgias, joint swelling and myalgias.   Skin: Negative for rash.   Neurological: Negative for dizziness, weakness, headaches and paresthesias.   Psychiatric/Behavioral: Negative for mood changes. The patient is not nervous/anxious.           OBJECTIVE:   /82   Pulse 79   Temp 97.3  F (36.3  C) (Tympanic)   Ht 1.594 m (5' 2.75\")   Wt 61.7 kg (136 lb)   SpO2 97%   BMI 24.28 kg/m    Physical Exam  GENERAL: healthy, alert and no distress  EYES: Eyes grossly normal to inspection, PERRL and conjunctivae and sclerae normal  HENT: ear canals and TM's normal, nose and mouth without ulcers or lesions  NECK: no adenopathy, no asymmetry, masses, or scars and thyroid normal to palpation  RESP: lungs clear to auscultation - no rales, rhonchi or wheezes  BREAST: normal without masses, tenderness or nipple discharge and no palpable axillary masses or adenopathy  CV: regular rate and rhythm, normal S1 S2, no S3 or S4, no murmur, click or rub, no peripheral edema and peripheral pulses strong  ABDOMEN: soft, nontender, no hepatosplenomegaly, no masses and bowel sounds normal   (female): normal female external genitalia, normal urethral meatus, vaginal " mucosa pink, moist, well rugated, and normal cervix/adnexa/uterus without masses or discharge. IUD strings visualized  MS: no gross musculoskeletal defects noted, no edema  SKIN: no suspicious lesions or rashes  NEURO: Normal strength and tone, mentation intact and speech normal  PSYCH: mentation appears normal, affect normal/bright    Diagnostic Test Results:  Labs reviewed in Epic    ASSESSMENT/PLAN:   (Z00.00) Routine general medical examination at a health care facility  (primary encounter diagnosis)  Comment: Health maintenance reviewed and updated.  Will check insurance coverage for shingles vaccine. More convenient to get at pharmacy near her home  Plan: Pap imaged thin layer screen with HPV -         recommended age 30 - 65 years (select HPV order        below)            (I10) Essential hypertension  Comment: check BMP  Refills given   Plan: hydrochlorothiazide (HYDRODIURIL) 25 MG tablet,        Basic metabolic panel  (Ca, Cl, CO2, Creat,         Gluc, K, Na, BUN)            (G43.009) Migraine without aura and without status migrainosus, not intractable  Comment: stable, refills given  Plan: rizatriptan (MAXALT-MLT) 10 MG ODT            (N39.0) Recurrent UTI  Comment: trial of macrodantin for prevention  Plan: nitroFURantoin macrocrystal (MACRODANTIN) 50 MG        capsule            (B35.1) Onychomycosis  Comment: refill given   Plan: terbinafine (LAMISIL) 250 MG tablet              Patient has been advised of split billing requirements and indicates understanding: Yes      COUNSELING:  Reviewed preventive health counseling, as reflected in patient instructions       Regular exercise       Healthy diet/nutrition        She reports that she has never smoked. She has never used smokeless tobacco.      Kristen M. Kehr, PA-C [facility]

## 2023-02-15 LAB
ANION GAP SERPL CALCULATED.3IONS-SCNC: 7 MMOL/L (ref 3–14)
BUN SERPL-MCNC: 13 MG/DL (ref 7–30)
CALCIUM SERPL-MCNC: 8.9 MG/DL (ref 8.5–10.1)
CHLORIDE BLD-SCNC: 98 MMOL/L (ref 94–109)
CO2 SERPL-SCNC: 32 MMOL/L (ref 20–32)
CREAT SERPL-MCNC: 0.92 MG/DL (ref 0.52–1.04)
GFR SERPL CREATININE-BSD FRML MDRD: 75 ML/MIN/1.73M2
GLUCOSE BLD-MCNC: 100 MG/DL (ref 70–99)
POTASSIUM BLD-SCNC: 3.4 MMOL/L (ref 3.4–5.3)
SODIUM SERPL-SCNC: 137 MMOL/L (ref 133–144)

## 2023-02-16 ENCOUNTER — MYC MEDICAL ADVICE (OUTPATIENT)
Dept: FAMILY MEDICINE | Facility: CLINIC | Age: 53
End: 2023-02-16
Payer: COMMERCIAL

## 2023-02-16 DIAGNOSIS — R30.0 DYSURIA: Primary | ICD-10-CM

## 2023-02-16 RX ORDER — SULFAMETHOXAZOLE/TRIMETHOPRIM 800-160 MG
1 TABLET ORAL 2 TIMES DAILY
Qty: 6 TABLET | Refills: 0 | Status: SHIPPED | OUTPATIENT
Start: 2023-02-16 | End: 2023-12-10

## 2023-02-17 LAB
BKR LAB AP GYN ADEQUACY: NORMAL
BKR LAB AP GYN INTERPRETATION: NORMAL
BKR LAB AP HPV REFLEX: NORMAL
BKR LAB AP PREVIOUS ABNORMAL: NORMAL
PATH REPORT.COMMENTS IMP SPEC: NORMAL
PATH REPORT.COMMENTS IMP SPEC: NORMAL
PATH REPORT.RELEVANT HX SPEC: NORMAL

## 2023-02-22 ENCOUNTER — PATIENT OUTREACH (OUTPATIENT)
Dept: FAMILY MEDICINE | Facility: CLINIC | Age: 53
End: 2023-02-22
Payer: COMMERCIAL

## 2023-02-22 LAB
HUMAN PAPILLOMA VIRUS 16 DNA: NEGATIVE
HUMAN PAPILLOMA VIRUS 18 DNA: NEGATIVE
HUMAN PAPILLOMA VIRUS FINAL DIAGNOSIS: ABNORMAL
HUMAN PAPILLOMA VIRUS OTHER HR: POSITIVE

## 2023-03-08 ENCOUNTER — ANCILLARY PROCEDURE (OUTPATIENT)
Dept: MAMMOGRAPHY | Facility: CLINIC | Age: 53
End: 2023-03-08
Attending: PHYSICIAN ASSISTANT
Payer: COMMERCIAL

## 2023-03-08 DIAGNOSIS — Z12.31 VISIT FOR SCREENING MAMMOGRAM: ICD-10-CM

## 2023-03-08 PROCEDURE — 77067 SCR MAMMO BI INCL CAD: CPT | Performed by: STUDENT IN AN ORGANIZED HEALTH CARE EDUCATION/TRAINING PROGRAM

## 2023-03-08 PROCEDURE — 77063 BREAST TOMOSYNTHESIS BI: CPT | Performed by: STUDENT IN AN ORGANIZED HEALTH CARE EDUCATION/TRAINING PROGRAM

## 2023-03-27 ENCOUNTER — MYC MEDICAL ADVICE (OUTPATIENT)
Dept: FAMILY MEDICINE | Facility: CLINIC | Age: 53
End: 2023-03-27
Payer: COMMERCIAL

## 2023-04-20 ENCOUNTER — TRANSFERRED RECORDS (OUTPATIENT)
Dept: HEALTH INFORMATION MANAGEMENT | Facility: CLINIC | Age: 53
End: 2023-04-20
Payer: COMMERCIAL

## 2023-04-30 DIAGNOSIS — B35.1 ONYCHOMYCOSIS: ICD-10-CM

## 2023-05-01 NOTE — TELEPHONE ENCOUNTER
Patient should discuss getting liver function tests checked with Kehr for long term refill of anti-fungal. Maybe she can do an evisit with her in future. Richard Castaneda MD

## 2023-05-02 RX ORDER — TERBINAFINE HYDROCHLORIDE 250 MG/1
TABLET ORAL
Qty: 90 TABLET | Refills: 0 | OUTPATIENT
Start: 2023-05-02

## 2023-05-02 NOTE — TELEPHONE ENCOUNTER
Patient has had 90 days of treatment.   No refill is indicated. Therapy is complete  Kristen Kehr PA-C

## 2023-05-22 ENCOUNTER — TRANSFERRED RECORDS (OUTPATIENT)
Dept: HEALTH INFORMATION MANAGEMENT | Facility: CLINIC | Age: 53
End: 2023-05-22
Payer: COMMERCIAL

## 2023-09-27 DIAGNOSIS — N39.0 RECURRENT UTI: ICD-10-CM

## 2023-09-27 RX ORDER — NITROFURANTOIN MACROCRYSTALS 50 MG/1
CAPSULE ORAL
Qty: 30 CAPSULE | Refills: 3 | Status: SHIPPED | OUTPATIENT
Start: 2023-09-27 | End: 2023-12-10

## 2023-12-10 ENCOUNTER — OFFICE VISIT (OUTPATIENT)
Dept: URGENT CARE | Facility: URGENT CARE | Age: 53
End: 2023-12-10
Payer: COMMERCIAL

## 2023-12-10 VITALS
BODY MASS INDEX: 24.11 KG/M2 | RESPIRATION RATE: 16 BRPM | HEART RATE: 79 BPM | DIASTOLIC BLOOD PRESSURE: 89 MMHG | SYSTOLIC BLOOD PRESSURE: 134 MMHG | WEIGHT: 135 LBS | TEMPERATURE: 97.6 F | OXYGEN SATURATION: 98 %

## 2023-12-10 DIAGNOSIS — J40 BRONCHITIS: Primary | ICD-10-CM

## 2023-12-10 PROCEDURE — 99213 OFFICE O/P EST LOW 20 MIN: CPT | Performed by: FAMILY MEDICINE

## 2023-12-10 RX ORDER — PREDNISONE 20 MG/1
40 TABLET ORAL DAILY
Qty: 10 TABLET | Refills: 0 | Status: SHIPPED | OUTPATIENT
Start: 2023-12-10 | End: 2023-12-15

## 2023-12-10 RX ORDER — CODEINE PHOSPHATE AND GUAIFENESIN 10; 100 MG/5ML; MG/5ML
1-2 SOLUTION ORAL EVERY 4 HOURS PRN
Qty: 120 ML | Refills: 0 | Status: SHIPPED | OUTPATIENT
Start: 2023-12-10 | End: 2024-04-01

## 2023-12-10 ASSESSMENT — ENCOUNTER SYMPTOMS
ENDOCRINE NEGATIVE: 1
COUGH: 1
RHINORRHEA: 1
CARDIOVASCULAR NEGATIVE: 1
MUSCULOSKELETAL NEGATIVE: 1
SHORTNESS OF BREATH: 0
EYES NEGATIVE: 1
FATIGUE: 0
ALLERGIC/IMMUNOLOGIC NEGATIVE: 1
WHEEZING: 0
GASTROINTESTINAL NEGATIVE: 1
ACTIVITY CHANGE: 0
SINUS PRESSURE: 1
CONSTITUTIONAL NEGATIVE: 1
HEMATOLOGIC/LYMPHATIC NEGATIVE: 1
NEUROLOGICAL NEGATIVE: 1

## 2023-12-10 NOTE — PROGRESS NOTES
SUBJECTIVE:   Sarah Padilla is a 53 year old female presenting with a chief complaint of   Chief Complaint   Patient presents with    Urgent Care     Present for deep cough for 2 weeks and nasal congestion since yesterday.        She is an established patient of [facility].    URI Adult    Onset of symptoms was 2 week(s) ago.  Course of illness is worsening.    Severity moderate  Current and Associated symptoms: runny nose and cough - non-productive  Treatment measures tried include Tylenol/Ibuprofen and Decongestants.  Predisposing factors include None.        Patient is a 53 yr old female here for a cough ongoing for about two weeks .cough is a deep harsh cough, mostly dry. Patient reports no fevers or chills. She has had no shortness of breath and no wheezing. She has been using over the counter medication with no relief. She also has some nasal congestion.    Review of Systems   Constitutional: Negative.  Negative for activity change and fatigue.   HENT:  Positive for congestion, postnasal drip, rhinorrhea and sinus pressure.    Eyes: Negative.    Respiratory:  Positive for cough. Negative for shortness of breath and wheezing.    Cardiovascular: Negative.    Gastrointestinal: Negative.    Endocrine: Negative.    Genitourinary: Negative.    Musculoskeletal: Negative.    Skin: Negative.    Allergic/Immunologic: Negative.    Neurological: Negative.    Hematological: Negative.        Past Medical History:   Diagnosis Date    Daily headache 10/2/2016    Daily headache     Daily headache     Inflammatory arthritis 4/4/2012    NO ACTIVE PROBLEMS      Family History   Problem Relation Age of Onset    Alzheimer Disease Father     Hypertension Father     Respiratory Maternal Grandmother         Emphysema    Cancer Maternal Grandfather         Lung/Skin     Current Outpatient Medications   Medication Sig Dispense Refill    ferrous sulfate (IRON) 325 (65 FE) MG tablet Take 1 tablet (325 mg) by mouth 2 times daily 180 tablet 3     guaiFENesin-codeine (ROBITUSSIN AC) 100-10 MG/5ML solution Take 5-10 mLs by mouth every 4 hours as needed for cough 120 mL 0    hydrochlorothiazide (HYDRODIURIL) 25 MG tablet Take 1 tablet (25 mg) by mouth daily 90 tablet 3    predniSONE (DELTASONE) 20 MG tablet Take 2 tablets (40 mg) by mouth daily for 5 days 10 tablet 0    rizatriptan (MAXALT-MLT) 10 MG ODT DISSOLVE 1 TABLET BY MOUTH AT ONSET OF HEADACHE FOR MIGRAINE. MAY REPEAT IN 2 HOURS. MAX 3/24 HOURS. Strength: 10 mg 12 tablet 11     Social History     Tobacco Use    Smoking status: Never    Smokeless tobacco: Never   Substance Use Topics    Alcohol use: Yes     Comment: occasionally; socially       OBJECTIVE  /89   Pulse 79   Temp 97.6  F (36.4  C) (Tympanic)   Resp 16   Wt 61.2 kg (135 lb)   SpO2 98%   BMI 24.11 kg/m      Physical Exam  Constitutional:       Appearance: Normal appearance.   HENT:      Head: Normocephalic and atraumatic.      Nose: Nose normal.   Cardiovascular:      Rate and Rhythm: Normal rate and regular rhythm.      Pulses: Normal pulses.      Heart sounds: Normal heart sounds.   Pulmonary:      Effort: Pulmonary effort is normal. No respiratory distress.      Breath sounds: Normal breath sounds. No stridor. No rhonchi or rales.   Chest:      Chest wall: No tenderness.   Abdominal:      General: Abdomen is flat. Bowel sounds are normal.      Palpations: Abdomen is soft.   Musculoskeletal:         General: Normal range of motion.      Cervical back: Normal range of motion and neck supple.   Skin:     General: Skin is warm and dry.   Neurological:      Mental Status: She is alert and oriented to person, place, and time.   Psychiatric:         Mood and Affect: Mood normal.         Behavior: Behavior normal.         Labs:  No results found for this or any previous visit (from the past 24 hour(s)).    X-Ray was not done.    ASSESSMENT:      ICD-10-CM    1. Bronchitis  J40 predniSONE (DELTASONE) 20 MG tablet      guaiFENesin-codeine (ROBITUSSIN AC) 100-10 MG/5ML solution       Patient reassured. This is likely viral. Recommend increase in fluids. Warm liquids , honey. Prednisone and cough medication faxed. If symptoms persist recommend seeing her primary.     Medical Decision Making:    Differential Diagnosis:  URI Adult/Peds:  Bronchitis-viral    Serious Comorbid Conditions:  Adult:  None    PLAN:    URI Adult:  Tylenol, Ibuprofen, Fluids, Rest, and OTC cough suppressant/expectorant    Followup:    If not improving or if condition worsens, follow up with your Primary Care Provider    There are no Patient Instructions on file for this visit.

## 2023-12-14 DIAGNOSIS — Z12.11 COLON CANCER SCREENING: ICD-10-CM

## 2023-12-28 ENCOUNTER — LAB (OUTPATIENT)
Dept: FAMILY MEDICINE | Facility: CLINIC | Age: 53
End: 2023-12-28
Payer: COMMERCIAL

## 2023-12-28 DIAGNOSIS — Z12.11 COLON CANCER SCREENING: ICD-10-CM

## 2024-01-15 ENCOUNTER — PATIENT OUTREACH (OUTPATIENT)
Dept: CARE COORDINATION | Facility: CLINIC | Age: 54
End: 2024-01-15
Payer: COMMERCIAL

## 2024-01-21 LAB — NONINV COLON CA DNA+OCC BLD SCRN STL QL: NEGATIVE

## 2024-01-26 PROBLEM — R87.810 CERVICAL HIGH RISK HPV (HUMAN PAPILLOMAVIRUS) TEST POSITIVE: Status: ACTIVE | Noted: 2023-02-14

## 2024-01-29 ENCOUNTER — PATIENT OUTREACH (OUTPATIENT)
Dept: CARE COORDINATION | Facility: CLINIC | Age: 54
End: 2024-01-29
Payer: COMMERCIAL

## 2024-02-07 ENCOUNTER — PATIENT OUTREACH (OUTPATIENT)
Dept: CARE COORDINATION | Facility: CLINIC | Age: 54
End: 2024-02-07
Payer: COMMERCIAL

## 2024-02-24 DIAGNOSIS — I10 ESSENTIAL HYPERTENSION: ICD-10-CM

## 2024-02-24 NOTE — LETTER
February 26, 2024    Sarah Padilla  [address]    [place] [address]    Dear Sarah,     We recently received a refill request for Hydrochlorothiazide.  We have refilled this for a one time 90 day supply only because you are due for a:    Medication Recheck office visit.    Please call at your earliest convenience so that there will not be a delay with your future refills.    Thank you,         Your [facility] Team/bmc  [phone]

## 2024-02-26 RX ORDER — HYDROCHLOROTHIAZIDE 25 MG/1
25 TABLET ORAL DAILY
Qty: 90 TABLET | Refills: 0 | Status: SHIPPED | OUTPATIENT
Start: 2024-02-26 | End: 2024-04-01

## 2024-02-26 NOTE — TELEPHONE ENCOUNTER
22-Nov-2023 22:47 Please send a reminder to schedule an office visit for medication check for hypertension   Kristen Kehr PA-C

## 2024-03-12 ENCOUNTER — ANCILLARY PROCEDURE (OUTPATIENT)
Dept: MAMMOGRAPHY | Facility: CLINIC | Age: 54
End: 2024-03-12
Attending: PHYSICIAN ASSISTANT
Payer: COMMERCIAL

## 2024-03-12 DIAGNOSIS — Z12.31 VISIT FOR SCREENING MAMMOGRAM: ICD-10-CM

## 2024-03-12 PROCEDURE — 77063 BREAST TOMOSYNTHESIS BI: CPT | Mod: GC | Performed by: RADIOLOGY

## 2024-03-12 PROCEDURE — 77067 SCR MAMMO BI INCL CAD: CPT | Mod: GC | Performed by: RADIOLOGY

## 2024-03-31 SDOH — HEALTH STABILITY: PHYSICAL HEALTH: ON AVERAGE, HOW MANY MINUTES DO YOU ENGAGE IN EXERCISE AT THIS LEVEL?: 40 MIN

## 2024-03-31 SDOH — HEALTH STABILITY: PHYSICAL HEALTH: ON AVERAGE, HOW MANY DAYS PER WEEK DO YOU ENGAGE IN MODERATE TO STRENUOUS EXERCISE (LIKE A BRISK WALK)?: 2 DAYS

## 2024-03-31 ASSESSMENT — SOCIAL DETERMINANTS OF HEALTH (SDOH): HOW OFTEN DO YOU GET TOGETHER WITH FRIENDS OR RELATIVES?: TWICE A WEEK

## 2024-03-31 ASSESSMENT — PATIENT HEALTH QUESTIONNAIRE - PHQ9
SUM OF ALL RESPONSES TO PHQ QUESTIONS 1-9: 0
SUM OF ALL RESPONSES TO PHQ QUESTIONS 1-9: 0

## 2024-03-31 NOTE — COMMUNITY RESOURCES LIST (ENGLISH)
March 31, 2024           YOUR PERSONALIZED LIST OF SERVICES & PROGRAMS           & SHELTER    Housing      Free - Client Services  [address] [place] [address] (Distance: 13.7 miles)  Phone: [phone]  Website: [email or web address]  Language: English  Fee: Free  Transportation Options: Free transportation      [employer] - YOUTH detention  Phone: [phone]  Website: https://www.[employer].org/  Language: English      [facility] - Housing Stabilization Services  Phone: (844) 241-5274  Website: https://[employer]/Housing-Stabilization.html  Language: English  Hours: Mon 9:00 AM - 5:00 PM Tue 9:00 AM - 5:00 PM Wed 9:00 AM - 5:00 PM Thu 9:00 AM - 5:00 PM Fri 9:00 AM - 5:00 PM  Fee: Insurance  Accessibility: Deaf or hard of hearing, Translation services    Case Management      Living - Housing Stabilization Services  [address] [place] [address] (Distance: 6.6 miles)  Phone: [phone]  Website: https://[employer]  Language: Luxembourgish, English, Yemeni  Fee: Insurance, Self pay      - Housing search assistance  [address] [place] [address] (Distance: 2.1 miles)  Phone: [phone]) 408-1073  Website: http://www.[employer].org/  Language: English, Cook Islander, Yemeni  Fee: Free  Accessibility: [employer] - Housing Stabilization Services  Phone: (839) 120-9112  Website: https://homebasemn.com/  Language: English  Hours: Mon 8:00 AM - 4:00 PM Tue 8:00 AM - 4:00 PM Wed 8:00 AM - 4:00 PM Thu 8:00 AM - 4:00 PM Fri 8:00 AM - 4:00 PM  Fee: Free  Accessibility: Blind accommodation, Deaf or hard of hearing  Transportation Options: Free transportation    Drop-In Services      [facility] Warming or cooling center [facility]  [address] [place] [address] (Distance: 1.9 miles)  Phone: [phone]  Language: English, Yemeni  Fee: Free      [facility]  Warming or cooling center [facility]  [address] [place] [address] (Distance: 1.6 miles)  Phone: [phone]  Language: Afghan, English, Belarusian, Indonesian, Chilean  Fee: Free  Accessibility: Translation services      [employer]OVE - CARONRY LOVE  Website: http://www.laundrylove.org               IMPORTANT NUMBERS & WEBSITES        Emergency Services  911  .   United Way  211 http://211unitedway.org  .   Poison Control  (550) 352-7851 http://[place]poison.org http://wis[place]poison.org  .     Suicide and Crisis Lifeline  988 http://988Flash Auto Detailingline.org  .   Childhelp [place] Child Abuse Hotline  722.450.6401 http://Childhelphotline.org   .   [place] Sexual Assault Hotline  (506) 821-2681 (HOPE) http://Skinkers.org   .     [place] Runaway Safeline  (184) 525-8040 (RUNAWAY) http://ScheduleSoft.GCW  .   Pregnancy & Postpartum Support  Call/text 072-280-2438  [place]: http://ppsupportmn.org  [place]: http://Pinevio.com/wi  .   Substance Abuse National Helpline ([facility])  838-012-HELP (4669) http://Findtreatment.gov   .                DISCLAIMER: [employer] does not endorse any service providers mentioned in this resource list. [employer] does not guarantee that the services mentioned in this resource list will be available to you or will improve your health or wellness.    [facility]

## 2024-04-01 ENCOUNTER — OFFICE VISIT (OUTPATIENT)
Dept: FAMILY MEDICINE | Facility: CLINIC | Age: 54
End: 2024-04-01
Payer: COMMERCIAL

## 2024-04-01 VITALS
RESPIRATION RATE: 16 BRPM | HEIGHT: 64 IN | BODY MASS INDEX: 23.22 KG/M2 | DIASTOLIC BLOOD PRESSURE: 86 MMHG | TEMPERATURE: 96.7 F | WEIGHT: 136 LBS | SYSTOLIC BLOOD PRESSURE: 132 MMHG | HEART RATE: 79 BPM | OXYGEN SATURATION: 100 %

## 2024-04-01 DIAGNOSIS — R73.09 ELEVATED GLUCOSE: ICD-10-CM

## 2024-04-01 DIAGNOSIS — R87.810 CERVICAL HIGH RISK HPV (HUMAN PAPILLOMAVIRUS) TEST POSITIVE: ICD-10-CM

## 2024-04-01 DIAGNOSIS — Z12.4 CERVICAL CANCER SCREENING: ICD-10-CM

## 2024-04-01 DIAGNOSIS — G43.009 MIGRAINE WITHOUT AURA AND WITHOUT STATUS MIGRAINOSUS, NOT INTRACTABLE: ICD-10-CM

## 2024-04-01 DIAGNOSIS — Z00.00 ROUTINE GENERAL MEDICAL EXAMINATION AT A HEALTH CARE FACILITY: Primary | ICD-10-CM

## 2024-04-01 DIAGNOSIS — I10 ESSENTIAL HYPERTENSION: ICD-10-CM

## 2024-04-01 DIAGNOSIS — B35.1 ONYCHOMYCOSIS: ICD-10-CM

## 2024-04-01 DIAGNOSIS — N39.0 RECURRENT UTI: ICD-10-CM

## 2024-04-01 LAB — HBA1C MFR BLD: 5.1 % (ref 0–5.6)

## 2024-04-01 PROCEDURE — 83036 HEMOGLOBIN GLYCOSYLATED A1C: CPT | Performed by: PHYSICIAN ASSISTANT

## 2024-04-01 PROCEDURE — 99214 OFFICE O/P EST MOD 30 MIN: CPT | Mod: 25 | Performed by: PHYSICIAN ASSISTANT

## 2024-04-01 PROCEDURE — 87624 HPV HI-RISK TYP POOLED RSLT: CPT | Performed by: PHYSICIAN ASSISTANT

## 2024-04-01 PROCEDURE — 80048 BASIC METABOLIC PNL TOTAL CA: CPT | Performed by: PHYSICIAN ASSISTANT

## 2024-04-01 PROCEDURE — G0145 SCR C/V CYTO,THINLAYER,RESCR: HCPCS | Performed by: PHYSICIAN ASSISTANT

## 2024-04-01 PROCEDURE — 99396 PREV VISIT EST AGE 40-64: CPT | Mod: 25 | Performed by: PHYSICIAN ASSISTANT

## 2024-04-01 PROCEDURE — 36415 COLL VENOUS BLD VENIPUNCTURE: CPT | Performed by: PHYSICIAN ASSISTANT

## 2024-04-01 PROCEDURE — 90715 TDAP VACCINE 7 YRS/> IM: CPT | Performed by: PHYSICIAN ASSISTANT

## 2024-04-01 PROCEDURE — 80061 LIPID PANEL: CPT | Performed by: PHYSICIAN ASSISTANT

## 2024-04-01 PROCEDURE — 90471 IMMUNIZATION ADMIN: CPT | Performed by: PHYSICIAN ASSISTANT

## 2024-04-01 RX ORDER — NITROFURANTOIN MACROCRYSTALS 50 MG/1
CAPSULE ORAL
Qty: 30 CAPSULE | Refills: 3 | Status: SHIPPED | OUTPATIENT
Start: 2024-04-01

## 2024-04-01 RX ORDER — HYDROCHLOROTHIAZIDE 25 MG/1
25 TABLET ORAL DAILY
Qty: 90 TABLET | Refills: 0 | Status: SHIPPED | OUTPATIENT
Start: 2024-04-01 | End: 2024-04-02 | Stop reason: SINTOL

## 2024-04-01 RX ORDER — RIZATRIPTAN BENZOATE 10 MG/1
TABLET, ORALLY DISINTEGRATING ORAL
Qty: 12 TABLET | Refills: 11 | Status: SHIPPED | OUTPATIENT
Start: 2024-04-01 | End: 2024-09-02

## 2024-04-01 ASSESSMENT — PAIN SCALES - GENERAL: PAINLEVEL: NO PAIN (0)

## 2024-04-01 NOTE — PATIENT INSTRUCTIONS
Preventive Care Advice   This is general advice given by our system to help you stay healthy. However, your care team may have specific advice just for you. Please talk to your care team about your preventive care needs.  Nutrition  Eat 5 or more servings of fruits and vegetables each day.  Try wheat bread, brown rice and whole grain pasta (instead of white bread, rice, and pasta).  Get enough calcium and vitamin D. Check the label on foods and aim for 100% of the RDA (recommended daily allowance).  Lifestyle  Exercise at least 150 minutes each week   (30 minutes a day, 5 days a week).  Do muscle strengthening activities 2 days a week. These help control your weight and prevent disease.  No smoking.  Wear sunscreen to prevent skin cancer.  Have a dental exam and cleaning every 6 months.  Yearly exams  See your health care team every year to talk about:  Any changes in your health.  Any medicines your care team has prescribed.  Preventive care, family planning, and ways to prevent chronic diseases.  Shots (vaccines)   HPV shots (up to age 26), if you've never had them before.  Hepatitis B shots (up to age 59), if you've never had them before.  COVID-19 shot: Get this shot when it's due.  Flu shot: Get a flu shot every year.  Tetanus shot: Get a tetanus shot every 10 years.  Pneumococcal, hepatitis A, and RSV shots: Ask your care team if you need these based on your risk.  Shingles shot (for age 50 and up).  General health tests  Diabetes screening:  Starting at age 35, Get screened for diabetes at least every 3 years.  If you are younger than age 35, ask your care team if you should be screened for diabetes.  Cholesterol test: At age 39, start having a cholesterol test every 5 years, or more often if advised.  Bone density scan (DEXA): At age 50, ask your care team if you should have this scan for osteoporosis (brittle bones).  Hepatitis C: Get tested at least once in your life.  STIs (sexually transmitted  infections)  Before age 24: Ask your care team if you should be screened for STIs.  After age 24: Get screened for STIs if you're at risk. You are at risk for STIs (including HIV) if:  You are sexually active with more than one person.  You don't use condoms every time.  You or a partner was diagnosed with a sexually transmitted infection.  If you are at risk for HIV, ask about PrEP medicine to prevent HIV.  Get tested for HIV at least once in your life, whether you are at risk for HIV or not.  Cancer screening tests  Cervical cancer screening: If you have a cervix, begin getting regular cervical cancer screening tests at age 21. Most people who have regular screenings with normal results can stop after age 65. Talk about this with your provider.  Breast cancer scan (mammogram): If you've ever had breasts, begin having regular mammograms starting at age 40. This is a scan to check for breast cancer.  Colon cancer screening: It is important to start screening for colon cancer at age 45.  Have a colonoscopy test every 10 years (or more often if you're at risk) Or, ask your provider about stool tests like a FIT test every year or Cologuard test every 3 years.  To learn more about your testing options, visit: https://www.Monetate/636093.pdf.  For help making a decision, visit: https://bit.ly/zo90434.  Prostate cancer screening test: If you have a prostate and are age 55 to 69, ask your provider if you would benefit from a yearly prostate cancer screening test.  Lung cancer screening: If you are a current or former smoker age 50 to 80, ask your care team if ongoing lung cancer screenings are right for you.  For informational purposes only. Not to replace the advice of your health care provider. Copyright   2023 [employer]. All rights reserved. Clinically reviewed by the [facility]. Chemayi 844092 - REV 01/24.    Learning About Stress  What is stress?     Stress is your  body's response to a hard situation. Your body can have a physical, emotional, or mental response. Stress is a fact of life for most people, and it affects everyone differently. What causes stress for you may not be stressful for someone else.  A lot of things can cause stress. You may feel stress when you go on a job interview, take a test, or run a race. This kind of short-term stress is normal and even useful. It can help you if you need to work hard or react quickly. For example, stress can help you finish an important job on time.  Long-term stress is caused by ongoing stressful situations or events. Examples of long-term stress include long-term health problems, ongoing problems at work, or conflicts in your family. Long-term stress can harm your health.  How does stress affect your health?  When you are stressed, your body responds as though you are in danger. It makes hormones that speed up your heart, make you breathe faster, and give you a burst of energy. This is called the fight-or-flight stress response. If the stress is over quickly, your body goes back to normal and no harm is done.  But if stress happens too often or lasts too long, it can have bad effects. Long-term stress can make you more likely to get sick, and it can make symptoms of some diseases worse. If you tense up when you are stressed, you may develop neck, shoulder, or low back pain. Stress is linked to high blood pressure and heart disease.  Stress also harms your emotional health. It can make you edgar, tense, or depressed. Your relationships may suffer, and you may not do well at work or school.  What can you do to manage stress?  You can try these things to help manage stress:   Do something active. Exercise or activity can help reduce stress. Walking is a great way to get started. Even everyday activities such as housecleaning or yard work can help.  Try yoga or oxana chi. These techniques combine exercise and meditation. You may need  some training at first to learn them.  Do something you enjoy. For example, listen to music or go to a movie. Practice your hobby or do volunteer work.  Meditate. This can help you relax, because you are not worrying about what happened before or what may happen in the future.  Do guided imagery. Imagine yourself in any setting that helps you feel calm. You can use online videos, books, or a teacher to guide you.  Do breathing exercises. For example:  From a standing position, bend forward from the waist with your knees slightly bent. Let your arms dangle close to the floor.  Breathe in slowly and deeply as you return to a standing position. Roll up slowly and lift your head last.  Hold your breath for just a few seconds in the standing position.  Breathe out slowly and bend forward from the waist.  Let your feelings out. Talk, laugh, cry, and express anger when you need to. Talking with supportive friends or family, a counselor, or a ronnie leader about your feelings is a healthy way to relieve stress. Avoid discussing your feelings with people who make you feel worse.  Write. It may help to write about things that are bothering you. This helps you find out how much stress you feel and what is causing it. When you know this, you can find better ways to cope.  What can you do to prevent stress?  You might try some of these things to help prevent stress:  Manage your time. This helps you find time to do the things you want and need to do.  Get enough sleep. Your body recovers from the stresses of the day while you are sleeping.  Get support. Your family, friends, and community can make a difference in how you experience stress.  Limit your news feed. Avoid or limit time on social media or news that may make you feel stressed.  Do something active. Exercise or activity can help reduce stress. Walking is a great way to get started.  Where can you learn more?  Go to https://www.healthwise.net/patiented  Enter N032 in the  "search box to learn more about \"Learning About Stress.\"  Current as of: October 24, 2023               Content Version: 14.0    0370-8632 [employer].   Care instructions adapted under license by your healthcare professional. If you have questions about a medical condition or this instruction, always ask your healthcare professional. [employer] disclaims any warranty or liability for your use of this information.      "

## 2024-04-01 NOTE — LETTER
My Depression Action Plan  Name: Sarah Padilla   Date of Birth 1970  Date: 4/1/2024    My doctor: Kehr, Kristen M   My clinic: [facility]  [address] [address] [phone]            GREEN    ZONE   Good Control    What it looks like:   Things are going generally well. You have normal ups and downs. You may even feel depressed from time to time, but bad moods usually last less than a day.   What you need to do:  Continue to care for yourself (see self care plan)  Check your depression survival kit and update it as needed  Follow your physician s recommendations including any medication.  Do not stop taking medication unless you consult with your physician first.             YELLOW         ZONE Getting Worse    What it looks like:   Depression is starting to interfere with your life.   It may be hard to get out of bed; you may be starting to isolate yourself from others.  Symptoms of depression are starting to last most all day and this has happened for several days.   You may have suicidal thoughts but they are not constant.   What you need to do:     Call your care team. Your response to treatment will improve if you keep your care team informed of your progress. Yellow periods are signs an adjustment may need to be made.     Continue your self-care.  Just get dressed and ready for the day.  Don't give yourself time to talk yourself out of it.    Talk to someone in your support network.    Open up your Depression Self-Care Plan/Wellness Kit.             RED    ZONE Medical Alert - Get Help    What it looks like:   Depression is seriously interfering with your life.   You may experience these or other symptoms: You can t get out of bed most days, can t work or engage in other necessary activities, you have trouble taking care of basic hygiene, or basic responsibilities, thoughts of suicide or death that will not go away, self-injurious behavior.      What you need to do:  Call your care team and request a same-day appointment. If they are not available (weekends or after hours) call your local crisis line, emergency room or 911.          Depression Self-Care Plan / Wellness Kit    Many people find that medication and therapy are helpful treatments for managing depression. In addition, making small changes to your everyday life can help to boost your mood and improve your wellbeing. Below are some tips for you to consider. Be sure to talk with your medical provider and/or behavioral health consultant if your symptoms are worsening or not improving.     Sleep   Sleep hygiene  means all of the habits that support good, restful sleep. It includes maintaining a consistent bedtime and wake time, using your bedroom only for sleeping or sex, and keeping the bedroom dark and free of distractions like a computer, smartphone, or television.     Develop a Healthy Routine  Maintain good hygiene. Get out of bed in the morning, make your bed, brush your teeth, take a shower, and get dressed. Don t spend too much time viewing media that makes you feel stressed. Find time to relax each day.    Exercise  Get some form of exercise every day. This will help reduce pain and release endorphins, the  feel good  chemicals in your brain. It can be as simple as just going for a walk or doing some gardening, anything that will get you moving.      Diet  Strive to eat healthy foods, including fruits and vegetables. Drink plenty of water. Avoid excessive sugar, caffeine, alcohol, and other mood-altering substances.     Stay Connected with Others  Stay in touch with friends and family members.    Manage Your Mood  Try deep breathing, massage therapy, biofeedback, or meditation. Take part in fun activities when you can. Try to find something to smile about each day.     Psychotherapy  Be open to working with a therapist if your provider recommends it.     Medication  Be sure to take your  medication as prescribed. Most anti-depressants need to be taken every day. It usually takes several weeks for medications to work. Not all medicines work for all people. It is important to follow-up with your provider to make sure you have a treatment plan that is working for you. Do not stop your medication abruptly without first discussing it with your provider.    Crisis Resources   These hotlines are for both adults and children. They and are open 24 hours a day, 7 days a week unless noted otherwise.    National Suicide Prevention Lifeline   988 or 3-502-255-ZSGH (1787)    Crisis Text Line    www.crisistextline.org  Text HOME to 176981 from anywhere in the [place], anytime, about any type of crisis. A live, trained crisis counselor will receive the text and respond quickly.    [place] Lifeline for LGBTQ Youth  A national crisis intervention and suicide lifeline for LGBTQ youth under 25. Provides a safe place to talk without judgement. Call 1-473.558.2924; text START to 039873 or visit www.theGalera Therapeuticsvorproject.org to talk to a trained counselor.    For [facility] crisis numbers, visit the [facility] website at:  https://[place].gov/dhs/people-we-serve/adults/health-care/mental-health/resources/crisis-contacts.jsp

## 2024-04-01 NOTE — PROGRESS NOTES
Preventive Care Visit  [facility] Kristen M. Kehr, PA-C, Family Medicine  Apr 1, 2024      Assessment & Plan     Routine general medical examination at a health care facility  Health maintenance reviewed and updated.  - Lipid panel reflex to direct LDL Non-fasting; Future  - Lipid panel reflex to direct LDL Non-fasting    Onychomycosis  Podiatry consultation   Lamisil x 2 not effective  Using OTC medication without success.   She has questions about toenail removal  - Orthopedic  Referral; Future    Recurrent UTI  Refill given   - nitroFURantoin macrocrystal (MACRODANTIN) 50 MG capsule; TAKE 1 CAPSULE BY MOUTH TO BE TAKEN AFTER INTERCOURSE FOR PREVENTION OF URINARY INFECTION      Essential hypertension    - Basic metabolic panel  (Ca, Cl, CO2, Creat, Gluc, K, Na, BUN); Future  - hydrochlorothiazide (HYDRODIURIL) 25 MG tablet; Take 1 tablet (25 mg) by mouth daily  - Basic metabolic panel  (Ca, Cl, CO2, Creat, Gluc, K, Na, BUN)    Migraine without aura and without status migrainosus, not intractable  Stable, refills given   - rizatriptan (MAXALT-MLT) 10 MG ODT; DISSOLVE 1 TABLET BY MOUTH AT ONSET OF HEADACHE FOR MIGRAINE. MAY REPEAT IN 2 HOURS. MAX 3/24 HOURS. Strength: 10 mg    Cervical high risk HPV (human papillomavirus) test positive  Cervical cancer screening  - Pap Screen with HPV - recommended age 30 - 65 years    Elevated glucose  - Hemoglobin A1c; Future  - Hemoglobin A1c              Counseling  Appropriate preventive services were discussed with this patient, including applicable screening as appropriate for fall prevention, nutrition, physical activity, Tobacco-use cessation, weight loss and cognition.  Checklist reviewing preventive services available has been given to the patient.  Reviewed patient's diet, addressing concerns and/or questions.   She is at risk for lack of exercise and has been provided with information to increase physical activity for the benefit of her  well-being.   She is at risk for psychosocial distress and has been provided with information to reduce risk.           Lauren Smith is a 54 year old, presenting for the following:  Physical        4/1/2024     3:43 PM   Additional Questions   Roomed by Bartolo MICHELLE MA        Health Care Directive  Patient does not have a Health Care Directive or Living Will: Discussed advance care planning with patient; however, patient declined at this time.    LALITA Smith is here for her routine preventative appointment and to address the following concerns.     Recurrent UTI, she will use the macrodantin after intercourse and this will help to prevent UTI. She will need a refill  Onychomycosis: treated x 2 with lamisil with no results. ? If removal of the nail is indicated  Hypertension: managed with hydrochlorothiazide and takes potassium daily  Migraine: managed with maxalt as needed.               3/31/2024   General Health   How would you rate your overall physical health? Good   Feel stress (tense, anxious, or unable to sleep) Only a little   (!) STRESS CONCERN      3/31/2024   Nutrition   Three or more servings of calcium each day? Yes   Diet: Regular (no restrictions)   How many servings of fruit and vegetables per day? (!) 2-3   How many sweetened beverages each day? 0-1         3/31/2024   Exercise   Days per week of moderate/strenous exercise 2 days   Average minutes spent exercising at this level 40 min   (!) EXERCISE CONCERN      3/31/2024   Social Factors   Frequency of gathering with friends or relatives Twice a week   Worry food won't last until get money to buy more No   Food not last or not have enough money for food? No   Do you have housing?  No   Are you worried about losing your housing? No   Lack of transportation? No   Unable to get utilities (heat,electricity)? No   Want help with housing or utility concern? No   (!) HOUSING CONCERN PRESENT      3/31/2024   Fall Risk   Fallen 2 or more times in the past  year? No   Trouble with walking or balance? No          3/31/2024   Dental   Dentist two times every year? Yes         3/31/2024   TB Screening   Were you born outside of the [place]? No       Today's PHQ-9 Score:       3/31/2024     5:24 PM   PHQ-9 SCORE   PHQ-9 Total Score MyChart 0   PHQ-9 Total Score 0         3/31/2024   Substance Use   Alcohol more than 3/day or more than 7/wk No   Do you use any other substances recreationally? No     Social History     Tobacco Use    Smoking status: Never    Smokeless tobacco: Never   Vaping Use    Vaping Use: Never used   Substance Use Topics    Alcohol use: Yes     Comment: occasionally; socially    Drug use: No             3/31/2024   Breast Cancer Screening   Family history of breast, colon, or ovarian cancer? No / Unknown         3/12/2024   LAST FHS-7 RESULTS   1st degree relative breast or ovarian cancer No   Any relative bilateral breast cancer No   Any male have breast cancer No   Any ONE woman have BOTH breast AND ovarian cancer No   Any woman with breast cancer before 50yrs No   2 or more relatives with breast AND/OR ovarian cancer No   2 or more relatives with breast AND/OR bowel cancer No        Mammogram Screening - Mammogram every 1-2 years updated in Health Maintenance based on mutual decision making        3/31/2024   STI Screening   New sexual partner(s) since last STI/HIV test? No     History of abnormal Pap smear: YES - updated in Problem List and Health Maintenance accordingly        Latest Ref Rng & Units 2/14/2023     2:29 PM 11/12/2018     5:16 PM 11/12/2018     5:15 PM   PAP / HPV   PAP  Negative for Intraepithelial Lesion or Malignancy (NILM)      PAP (Historical)   NIL     HPV 16 DNA Negative Negative   Negative    HPV 18 DNA Negative Negative   Negative    Other HR HPV Negative Positive   Negative      ASCVD Risk   The ASCVD Risk score (Bijan DELANEY, et al., 2019) failed to calculate for the following reasons:    Cannot find a previous HDL lab     Cannot find a previous total cholesterol lab           Reviewed and updated as needed this visit by Provider   Tobacco  Allergies  Meds  Problems  Med Hx  Surg Hx  Fam Hx            Past Medical History:   Diagnosis Date    Daily headache 10/2/2016    Daily headache     Daily headache     Inflammatory arthritis 2012    NO ACTIVE PROBLEMS      Past Surgical History:   Procedure Laterality Date    BUNIONECTOMY Right 2019    Procedure: BUNIONECTOMY Right;  Surgeon: Juan J Bustillos DPM;  Location: [facility] OR    No History of Surgery       OB History    Para Term  AB Living   4 4 4 0 0 3   SAB IAB Ectopic Multiple Live Births   0 0 0 0 4      # Outcome Date GA Lbr Chava/2nd Weight Sex Delivery Anes PTL Lv   4 Term  41w0d   M    LOI   3 Term  41w0d   M    LOI   2 Term  41w0d   F    DEC   1 Term  41w0d   M    LOI     BP Readings from Last 3 Encounters:   24 132/86   12/10/23 134/89   23 122/82    Wt Readings from Last 3 Encounters:   24 61.7 kg (136 lb)   12/10/23 61.2 kg (135 lb)   23 61.7 kg (136 lb)                  Patient Active Problem List   Diagnosis    CARDIOVASCULAR SCREENING; LDL GOAL LESS THAN 160    Inflammatory arthritis    Other iron deficiency anemia    Migraine without aura and without status migrainosus, not intractable    Daily headache    Bunion    DUB (dysfunctional uterine bleeding)    Onychomycosis    Elevated BP without diagnosis of hypertension    Cervical high risk HPV (human papillomavirus) test positive     Past Surgical History:   Procedure Laterality Date    BUNIONECTOMY Right 2019    Procedure: BUNIONECTOMY Right;  Surgeon: Juan J Bustillos DPM;  Location: [facility] OR    No History of Surgery         Social History     Tobacco Use    Smoking status: Never    Smokeless tobacco: Never   Substance Use Topics    Alcohol use: Yes     Comment: occasionally; socially     Family History   Problem Relation Age of Onset  "   Alzheimer Disease Father     Hypertension Father     Respiratory Maternal Grandmother         Emphysema    Cancer Maternal Grandfather         Lung/Skin         Current Outpatient Medications   Medication Sig Dispense Refill    ferrous sulfate (IRON) 325 (65 FE) MG tablet Take 1 tablet (325 mg) by mouth 2 times daily 180 tablet 3    hydrochlorothiazide (HYDRODIURIL) 25 MG tablet Take 1 tablet (25 mg) by mouth daily 90 tablet 0    nitroFURantoin macrocrystal (MACRODANTIN) 50 MG capsule TAKE 1 CAPSULE BY MOUTH TO BE TAKEN AFTER INTERCOURSE FOR PREVENTION OF URINARY INFECTION 30 capsule 3    rizatriptan (MAXALT-MLT) 10 MG ODT DISSOLVE 1 TABLET BY MOUTH AT ONSET OF HEADACHE FOR MIGRAINE. MAY REPEAT IN 2 HOURS. MAX 3/24 HOURS. Strength: 10 mg 12 tablet 11     No Known Allergies  Recent Labs   Lab Test 02/14/23  1429 05/02/22  1757 07/15/17  0957 04/25/16  1744 04/17/16  0922   LDL  --   --  49  --  58   HDL  --   --  74  --  72   TRIG  --   --  44  --  47   CR 0.92 0.86  --   --   --    GFRESTIMATED 75 81  --   --   --    POTASSIUM 3.4 3.2*  --   --   --    TSH  --   --   --  1.90  --           Review of Systems  Constitutional, HEENT, cardiovascular, pulmonary, GI, , musculoskeletal, neuro, skin, endocrine and psych systems are negative, except as otherwise noted.     Objective    Exam  /86   Pulse 79   Temp (!) 96.7  F (35.9  C) (Tympanic)   Resp 16   Ht 1.613 m (5' 3.5\")   Wt 61.7 kg (136 lb)   LMP  (LMP Unknown)   SpO2 100%   Breastfeeding No   BMI 23.71 kg/m     Estimated body mass index is 23.71 kg/m  as calculated from the following:    Height as of this encounter: 1.613 m (5' 3.5\").    Weight as of this encounter: 61.7 kg (136 lb).    Physical Exam  GENERAL: alert and no distress  EYES: Eyes grossly normal to inspection, PERRL and conjunctivae and sclerae normal  HENT: ear canals and TM's normal, nose and mouth without ulcers or lesions  NECK: no adenopathy, no asymmetry, masses, or " scars  RESP: lungs clear to auscultation - no rales, rhonchi or wheezes  BREAST: normal without masses, tenderness or nipple discharge and no palpable axillary masses or adenopathy  CV: regular rate and rhythm, normal S1 S2, no S3 or S4, no murmur, click or rub, no peripheral edema  ABDOMEN: soft, nontender, no hepatosplenomegaly, no masses and bowel sounds normal   (female): normal female external genitalia, normal urethral meatus, normal vaginal mucosa, and normal cervix/adnexa/uterus without masses or discharge  MS: no gross musculoskeletal defects noted, no edema  SKIN: no suspicious lesions or rashes  NEURO: Normal strength and tone, mentation intact and speech normal  PSYCH: mentation appears normal, affect normal/bright        Signed Electronically by: Kristen M. Kehr, PA-C

## 2024-04-02 DIAGNOSIS — I10 ESSENTIAL HYPERTENSION: Primary | ICD-10-CM

## 2024-04-02 LAB
ANION GAP SERPL CALCULATED.3IONS-SCNC: 13 MMOL/L (ref 7–15)
BUN SERPL-MCNC: 8.6 MG/DL (ref 6–20)
CALCIUM SERPL-MCNC: 9.1 MG/DL (ref 8.6–10)
CHLORIDE SERPL-SCNC: 90 MMOL/L (ref 98–107)
CHOLEST SERPL-MCNC: 190 MG/DL
CREAT SERPL-MCNC: 0.71 MG/DL (ref 0.51–0.95)
DEPRECATED HCO3 PLAS-SCNC: 27 MMOL/L (ref 22–29)
EGFRCR SERPLBLD CKD-EPI 2021: >90 ML/MIN/1.73M2
FASTING STATUS PATIENT QL REPORTED: NO
GLUCOSE SERPL-MCNC: 100 MG/DL (ref 70–99)
HDLC SERPL-MCNC: 109 MG/DL
LDLC SERPL CALC-MCNC: 73 MG/DL
NONHDLC SERPL-MCNC: 81 MG/DL
POTASSIUM SERPL-SCNC: 3.2 MMOL/L (ref 3.4–5.3)
SODIUM SERPL-SCNC: 130 MMOL/L (ref 135–145)
TRIGL SERPL-MCNC: 39 MG/DL

## 2024-04-02 RX ORDER — AMLODIPINE BESYLATE 5 MG/1
5 TABLET ORAL DAILY
Qty: 90 TABLET | Refills: 1 | Status: SHIPPED | OUTPATIENT
Start: 2024-04-02 | End: 2024-06-04

## 2024-04-02 NOTE — PROGRESS NOTES
Recent Results (from the past 240 hour(s))   Lipid panel reflex to direct LDL Non-fasting    Collection Time: 04/01/24  4:32 PM   Result Value Ref Range    Cholesterol 190 <200 mg/dL    Triglycerides 39 <150 mg/dL    Direct Measure  >=50 mg/dL    LDL Cholesterol Calculated 73 <=100 mg/dL    Non HDL Cholesterol 81 <130 mg/dL    Patient Fasting > 8hrs? No    Basic metabolic panel  (Ca, Cl, CO2, Creat, Gluc, K, Na, BUN)    Collection Time: 04/01/24  4:32 PM   Result Value Ref Range    Sodium 130 (L) 135 - 145 mmol/L    Potassium 3.2 (L) 3.4 - 5.3 mmol/L    Chloride 90 (L) 98 - 107 mmol/L    Carbon Dioxide (CO2) 27 22 - 29 mmol/L    Anion Gap 13 7 - 15 mmol/L    Urea Nitrogen 8.6 6.0 - 20.0 mg/dL    Creatinine 0.71 0.51 - 0.95 mg/dL    GFR Estimate >90 >60 mL/min/1.73m2    Calcium 9.1 8.6 - 10.0 mg/dL    Glucose 100 (H) 70 - 99 mg/dL   Hemoglobin A1c    Collection Time: 04/01/24  4:32 PM   Result Value Ref Range    Hemoglobin A1C 5.1 0.0 - 5.6 %         Results of BMP show low sodium and potassium    Result note sent with results and the following changes.     Stop the hydrochlorothiazide     2.  Start amlodipine 5 mg     3.  Schedule follow up in 1-2 months in clinic for blood pressure check         Medication list updated.   Hydrochlorothiazide discontinued  New prescription for amlodipine 5 mg sent to pharmacy.     Kristen Kehr PA-C  April 2, 2024  2:24 PM

## 2024-04-03 LAB
BKR LAB AP GYN ADEQUACY: NORMAL
BKR LAB AP GYN INTERPRETATION: NORMAL
BKR LAB AP HPV REFLEX: NORMAL
BKR LAB AP PREVIOUS ABNL DX: NORMAL
BKR LAB AP PREVIOUS ABNORMAL: NORMAL
PATH REPORT.COMMENTS IMP SPEC: NORMAL
PATH REPORT.COMMENTS IMP SPEC: NORMAL
PATH REPORT.RELEVANT HX SPEC: NORMAL

## 2024-04-05 ENCOUNTER — PATIENT OUTREACH (OUTPATIENT)
Dept: FAMILY MEDICINE | Facility: CLINIC | Age: 54
End: 2024-04-05
Payer: COMMERCIAL

## 2024-04-05 LAB
HUMAN PAPILLOMA VIRUS 16 DNA: NEGATIVE
HUMAN PAPILLOMA VIRUS 18 DNA: NEGATIVE
HUMAN PAPILLOMA VIRUS FINAL DIAGNOSIS: NORMAL
HUMAN PAPILLOMA VIRUS OTHER HR: NEGATIVE

## 2024-05-02 NOTE — TELEPHONE ENCOUNTER
DIAGNOSIS: Onychomycosis [B35.1]   APPOINTMENT DATE: 05/07/2024   NOTES STATUS DETAILS   OFFICE NOTE from referring provider Internal 04/01/2024 - Kehr, Kristen M, PA-C - [facility]   OFFICE NOTE from other specialist Internal 02/11/2020 - Juan J Bustillos DPM - [facility]   (IMAGES & REPORTS) Internal

## 2024-05-07 ENCOUNTER — PRE VISIT (OUTPATIENT)
Dept: ORTHOPEDICS | Facility: CLINIC | Age: 54
End: 2024-05-07

## 2024-05-07 ENCOUNTER — OFFICE VISIT (OUTPATIENT)
Dept: ORTHOPEDICS | Facility: CLINIC | Age: 54
End: 2024-05-07
Attending: PHYSICIAN ASSISTANT
Payer: COMMERCIAL

## 2024-05-07 DIAGNOSIS — B35.1 ONYCHOMYCOSIS: ICD-10-CM

## 2024-05-07 PROCEDURE — 99202 OFFICE O/P NEW SF 15 MIN: CPT | Performed by: PODIATRIST

## 2024-05-07 NOTE — LETTER
5/7/2024         RE: Sarah Padilla  [id number]  [address]  [place] [address]        Dear Colleague,    Thank you for referring your patient, Sarah Padilla, to the [facility] [place]. Please see a copy of my visit note below.    Date of Service: 5/7/2024    Chief Complaint:   Chief Complaint   Patient presents with    Consult     Onychomycosis.        HPI: Sarah is a 54 year old female who presents today for further evaluation of right hallux nail. Noticed discoloration of the nail a couple of years ago. Has tried multiple rounds of PO Lamisil for this with no change in the nail. No pain.     Review of Systems: No n/v/d/f/c/ns/sob/cp     PMH:   Past Medical History:   Diagnosis Date    Daily headache 10/2/2016    Daily headache     Daily headache     Inflammatory arthritis 4/4/2012    NO ACTIVE PROBLEMS        PSxH:   Past Surgical History:   Procedure Laterality Date    BUNIONECTOMY Right 12/31/2019    Procedure: BUNIONECTOMY Right;  Surgeon: Juan J Bustillos DPM;  Location: [facility] OR    No History of Surgery         Allergies: Patient has no known allergies.    SH:   Social History     Socioeconomic History    Marital status:      Spouse name: Not on file    Number of children: Not on file    Years of education: Not on file    Highest education level: Not on file   Occupational History    Not on file   Tobacco Use    Smoking status: Never    Smokeless tobacco: Never   Vaping Use    Vaping status: Never Used   Substance and Sexual Activity    Alcohol use: Yes     Comment: occasionally; socially    Drug use: No    Sexual activity: Yes     Partners: Male     Birth control/protection: I.U.D., None     Comment:  Vasectomy   Other Topics Concern    Parent/sibling w/ CABG, MI or angioplasty before 65F 55M? No   Social History Narrative    Not on file     Social Determinants of Health     Financial Resource Strain: Low Risk  (3/31/2024)    Financial Resource Strain      Within the past 12 months, have you or your family members you live with been unable to get utilities (heat, electricity) when it was really needed?: No   Food Insecurity: Low Risk  (3/31/2024)    Food Insecurity     Within the past 12 months, did you worry that your food would run out before you got money to buy more?: No     Within the past 12 months, did the food you bought just not last and you didn t have money to get more?: No   Transportation Needs: Low Risk  (3/31/2024)    Transportation Needs     Within the past 12 months, has lack of transportation kept you from medical appointments, getting your medicines, non-medical meetings or appointments, work, or from getting things that you need?: No   Physical Activity: Insufficiently Active (3/31/2024)    Exercise Vital Sign     Days of Exercise per Week: 2 days     Minutes of Exercise per Session: 40 min   Stress: No Stress Concern Present (3/31/2024)    [employer] [place] [employer] - Occupational Stress Questionnaire     Feeling of Stress : Only a little   Social Connections: Unknown (3/31/2024)    Social Connection and Isolation Panel [NHANES]     Frequency of Communication with Friends and Family: Not on file     Frequency of Social Gatherings with Friends and Family: Twice a week     Attends Hoahaoism Services: Not on file     Active Member of Clubs or Organizations: Not on file     Attends Club or Organization Meetings: Not on file     Marital Status: Not on file   Interpersonal Safety: Low Risk  (4/1/2024)    Interpersonal Safety     Do you feel physically and emotionally safe where you currently live?: Yes     Within the past 12 months, have you been hit, slapped, kicked or otherwise physically hurt by someone?: No     Within the past 12 months, have you been humiliated or emotionally abused in other ways by your partner or ex-partner?: No   Housing Stability: High Risk (3/31/2024)    Housing Stability     Do you have housing? : No     Are you  worried about losing your housing?: No       FH:   Family History   Problem Relation Age of Onset    Alzheimer Disease Father     Hypertension Father     Respiratory Maternal Grandmother         Emphysema    Cancer Maternal Grandfather         Lung/Skin       Objective:  Data Unavailable Data Unavailable Data Unavailable Data Unavailable Data Unavailable 0 lbs 0 oz    PT and DP pulses are 2/4 bilaterally. CRT is instant. Positive pedal hair.   Gross sensation is intact bilaterally.   Equinus is not noted bilaterally. No pain with active or passive ROM of the ankle, MTJ, 1st ray, or halluces bilaterally,.   Nail of the right hallux is slightly lytic and discolored without thickness. Positive subungual debris. No open lesions are noted.     Assessment:   Encounter Diagnoses   Name Primary?    Onychomycosis          Plan:  - Pt seen and evaluated.  - I discussed with her that PO Lamisil is the most efficacious treatment for mycosis. She has had multiple courses. It works about 70-75% of the time. With no improvement with these courses, nail will likely not be cured. She asked about laser therapy, which we do not offer here and is difficult to get insurance to cover. [place] would like [place] pursue.  - See again PRN.            Richard Choe, ANTONIO

## 2024-05-07 NOTE — PROGRESS NOTES
Date of Service: 5/7/2024    Chief Complaint:   Chief Complaint   Patient presents with    Consult     Onychomycosis.        HPI: Sarah is a 54 year old female who presents today for further evaluation of right hallux nail. Noticed discoloration of the nail a couple of years ago. Has tried multiple rounds of PO Lamisil for this with no change in the nail. No pain.     Review of Systems: No n/v/d/f/c/ns/sob/cp     PMH:   Past Medical History:   Diagnosis Date    Daily headache 10/2/2016    Daily headache     Daily headache     Inflammatory arthritis 4/4/2012    NO ACTIVE PROBLEMS        PSxH:   Past Surgical History:   Procedure Laterality Date    BUNIONECTOMY Right 12/31/2019    Procedure: BUNIONECTOMY Right;  Surgeon: Juan J Bustillos DPM;  Location: [facility] OR    No History of Surgery         Allergies: Patient has no known allergies.    SH:   Social History     Socioeconomic History    Marital status:      Spouse name: Not on file    Number of children: Not on file    Years of education: Not on file    Highest education level: Not on file   Occupational History    Not on file   Tobacco Use    Smoking status: Never    Smokeless tobacco: Never   Vaping Use    Vaping status: Never Used   Substance and Sexual Activity    Alcohol use: Yes     Comment: occasionally; socially    Drug use: No    Sexual activity: Yes     Partners: Male     Birth control/protection: I.U.D., None     Comment:  Vasectomy   Other Topics Concern    Parent/sibling w/ CABG, MI or angioplasty before 65F 55M? No   Social History Narrative    Not on file     Social Determinants of Health     Financial Resource Strain: Low Risk  (3/31/2024)    Financial Resource Strain     Within the past 12 months, have you or your family members you live with been unable to get utilities (heat, electricity) when it was really needed?: No   Food Insecurity: Low Risk  (3/31/2024)    Food Insecurity     Within the past 12 months, did you worry that your  food would run out before you got money to buy more?: No     Within the past 12 months, did the food you bought just not last and you didn t have money to get more?: No   Transportation Needs: Low Risk  (3/31/2024)    Transportation Needs     Within the past 12 months, has lack of transportation kept you from medical appointments, getting your medicines, non-medical meetings or appointments, work, or from getting things that you need?: No   Physical Activity: Insufficiently Active (3/31/2024)    Exercise Vital Sign     Days of Exercise per Week: 2 days     Minutes of Exercise per Session: 40 min   Stress: No Stress Concern Present (3/31/2024)    [employer] [facility] [employer] - Occupational Stress Questionnaire     Feeling of Stress : Only a little   Social Connections: Unknown (3/31/2024)    Social Connection and Isolation Panel [NHANES]     Frequency of Communication with Friends and Family: Not on file     Frequency of Social Gatherings with Friends and Family: Twice a week     Attends Pentecostal Services: Not on file     Active Member of Clubs or Organizations: Not on file     Attends Club or Organization Meetings: Not on file     Marital Status: Not on file   Interpersonal Safety: Low Risk  (4/1/2024)    Interpersonal Safety     Do you feel physically and emotionally safe where you currently live?: Yes     Within the past 12 months, have you been hit, slapped, kicked or otherwise physically hurt by someone?: No     Within the past 12 months, have you been humiliated or emotionally abused in other ways by your partner or ex-partner?: No   Housing Stability: High Risk (3/31/2024)    Housing Stability     Do you have housing? : No     Are you worried about losing your housing?: No       FH:   Family History   Problem Relation Age of Onset    Alzheimer Disease Father     Hypertension Father     Respiratory Maternal Grandmother         Emphysema    Cancer Maternal Grandfather         Lung/Skin        Objective:  Data Unavailable Data Unavailable Data Unavailable Data Unavailable Data Unavailable 0 lbs 0 oz    PT and DP pulses are 2/4 bilaterally. CRT is instant. Positive pedal hair.   Gross sensation is intact bilaterally.   Equinus is not noted bilaterally. No pain with active or passive ROM of the ankle, MTJ, 1st ray, or halluces bilaterally,.   Nail of the right hallux is slightly lytic and discolored without thickness. Positive subungual debris. No open lesions are noted.     Assessment:   Encounter Diagnoses   Name Primary?    Onychomycosis          Plan:  - Pt seen and evaluated.  - I discussed with her that PO Lamisil is the most efficacious treatment for mycosis. She has had multiple courses. It works about 70-75% of the time. With no improvement with these courses, nail will likely not be cured. She asked about laser therapy, which we do not offer here and is difficult to get insurance to cover[place] would like to pursue.  - See again PRN.

## 2024-06-04 ENCOUNTER — OFFICE VISIT (OUTPATIENT)
Dept: FAMILY MEDICINE | Facility: CLINIC | Age: 54
End: 2024-06-04
Payer: COMMERCIAL

## 2024-06-04 VITALS
HEIGHT: 64 IN | DIASTOLIC BLOOD PRESSURE: 92 MMHG | BODY MASS INDEX: 23.39 KG/M2 | HEART RATE: 82 BPM | WEIGHT: 137 LBS | TEMPERATURE: 97.3 F | SYSTOLIC BLOOD PRESSURE: 136 MMHG | OXYGEN SATURATION: 99 %

## 2024-06-04 DIAGNOSIS — I10 ESSENTIAL HYPERTENSION: ICD-10-CM

## 2024-06-04 PROCEDURE — 99213 OFFICE O/P EST LOW 20 MIN: CPT | Performed by: PHYSICIAN ASSISTANT

## 2024-06-04 RX ORDER — AMLODIPINE BESYLATE 10 MG/1
10 TABLET ORAL DAILY
Qty: 90 TABLET | Refills: 1 | Status: SHIPPED | OUTPATIENT
Start: 2024-06-04 | End: 2024-08-06

## 2024-06-04 ASSESSMENT — PAIN SCALES - GENERAL: PAINLEVEL: NO PAIN (0)

## 2024-06-04 NOTE — PROGRESS NOTES
"  Assessment & Plan     Essential hypertension  Increase to 10 mg of the amlodipine.   Recheck in 2-3 months  - amLODIPine (NORVASC) 10 MG tablet; Take 1 tablet (10 mg) by mouth daily                Subjective   Sarah is a 54 year old, presenting for the following health issues:  Hypertension        6/4/2024     6:48 AM   Additional Questions   Roomed by Bartolo MICHELLE MA     History of Present Illness       Hypertension: She presents for follow up of hypertension.  She does not check blood pressure  regularly outside of the clinic. Outpatient blood pressures have not been over 140/90. She does not follow a low salt diet.     She eats 0-1 servings of fruits and vegetables daily.She consumes 0 sweetened beverage(s) daily.She exercises with enough effort to increase her heart rate 20 to 29 minutes per day.  She exercises with enough effort to increase her heart rate 4 days per week.   She is taking medications regularly.       Sarah is here today for follow up after starting the amlodipine.   She is taking 5 mg daily and reports no side effects.             Review of Systems  Constitutional, HEENT, cardiovascular, pulmonary, GI, , musculoskeletal, neuro, skin, endocrine and psych systems are negative, except as otherwise noted.      Objective    BP (!) 136/92   Pulse 82   Temp 97.3  F (36.3  C) (Tympanic)   Ht 1.613 m (5' 3.5\")   Wt 62.1 kg (137 lb)   LMP  (LMP Unknown)   SpO2 99%   Breastfeeding No   BMI 23.89 kg/m    Body mass index is 23.89 kg/m .  Physical Exam   GENERAL: alert and no distress  PSYCH: mentation appears normal, affect normal/bright            Signed Electronically by: Kristen M. Kehr, PA-C    "

## 2024-08-06 ENCOUNTER — LAB (OUTPATIENT)
Dept: LAB | Facility: CLINIC | Age: 54
End: 2024-08-06
Payer: COMMERCIAL

## 2024-08-06 ENCOUNTER — OFFICE VISIT (OUTPATIENT)
Dept: FAMILY MEDICINE | Facility: CLINIC | Age: 54
End: 2024-08-06
Payer: COMMERCIAL

## 2024-08-06 VITALS
WEIGHT: 132 LBS | DIASTOLIC BLOOD PRESSURE: 82 MMHG | OXYGEN SATURATION: 100 % | BODY MASS INDEX: 22.53 KG/M2 | HEART RATE: 106 BPM | SYSTOLIC BLOOD PRESSURE: 126 MMHG | HEIGHT: 64 IN | TEMPERATURE: 97.3 F

## 2024-08-06 DIAGNOSIS — B35.1 FUNGAL INFECTION OF TOENAIL: ICD-10-CM

## 2024-08-06 DIAGNOSIS — I10 ESSENTIAL HYPERTENSION: Primary | ICD-10-CM

## 2024-08-06 DIAGNOSIS — I10 ESSENTIAL HYPERTENSION: ICD-10-CM

## 2024-08-06 DIAGNOSIS — B35.1 FUNGAL INFECTION OF TOENAIL: Primary | ICD-10-CM

## 2024-08-06 LAB
ALBUMIN SERPL BCG-MCNC: 4.7 G/DL (ref 3.5–5.2)
ALP SERPL-CCNC: 68 U/L (ref 40–150)
ALT SERPL W P-5'-P-CCNC: 19 U/L (ref 0–50)
ANION GAP SERPL CALCULATED.3IONS-SCNC: 12 MMOL/L (ref 7–15)
AST SERPL W P-5'-P-CCNC: 28 U/L (ref 0–45)
BILIRUB DIRECT SERPL-MCNC: <0.2 MG/DL (ref 0–0.3)
BILIRUB SERPL-MCNC: 0.3 MG/DL
BUN SERPL-MCNC: 14.5 MG/DL (ref 6–20)
CALCIUM SERPL-MCNC: 9.2 MG/DL (ref 8.8–10.4)
CHLORIDE SERPL-SCNC: 101 MMOL/L (ref 98–107)
CREAT SERPL-MCNC: 0.9 MG/DL (ref 0.51–0.95)
EGFRCR SERPLBLD CKD-EPI 2021: 76 ML/MIN/1.73M2
GLUCOSE SERPL-MCNC: 75 MG/DL (ref 70–99)
HCO3 SERPL-SCNC: 27 MMOL/L (ref 22–29)
HOLD SPECIMEN: NORMAL
POTASSIUM SERPL-SCNC: 3.5 MMOL/L (ref 3.4–5.3)
PROT SERPL-MCNC: 7.1 G/DL (ref 6.4–8.3)
SODIUM SERPL-SCNC: 140 MMOL/L (ref 135–145)

## 2024-08-06 PROCEDURE — 36415 COLL VENOUS BLD VENIPUNCTURE: CPT

## 2024-08-06 PROCEDURE — 80053 COMPREHEN METABOLIC PANEL: CPT

## 2024-08-06 PROCEDURE — 99213 OFFICE O/P EST LOW 20 MIN: CPT | Performed by: PHYSICIAN ASSISTANT

## 2024-08-06 PROCEDURE — 82248 BILIRUBIN DIRECT: CPT

## 2024-08-06 RX ORDER — AMLODIPINE BESYLATE 10 MG/1
10 TABLET ORAL DAILY
Qty: 90 TABLET | Refills: 3 | Status: SHIPPED | OUTPATIENT
Start: 2024-08-06

## 2024-08-06 ASSESSMENT — PAIN SCALES - GENERAL: PAINLEVEL: NO PAIN (0)

## 2024-08-06 NOTE — PROGRESS NOTES
"  Assessment & Plan     Essential hypertension  Check BMP today since it has been a few months after stopping the hydrochlorothiazide.   BP is well controlled.   Continue with the 10 mg of amlodipine  - Basic metabolic panel  (Ca, Cl, CO2, Creat, Gluc, K, Na, BUN); Future  - amLODIPine (NORVASC) 10 MG tablet; Take 1 tablet (10 mg) by mouth daily                Subjective   Sarah is a 54 year old, presenting for the following health issues:  Hypertension        8/6/2024     6:48 AM   Additional Questions   Roomed by Bartolo MICHELLE MA     History of Present Illness       Hypertension: She presents for follow up of hypertension.  She does not check blood pressure  regularly outside of the clinic. Outpatient blood pressures have not been over 140/90. She does not follow a low salt diet.     She eats 2-3 servings of fruits and vegetables daily.She consumes 0 sweetened beverage(s) daily.She exercises with enough effort to increase her heart rate 30 to 60 minutes per day.  She exercises with enough effort to increase her heart rate 3 or less days per week.   She is taking medications regularly.       Sarah is here today for hypertension. She is now on the amlodipine 10 mg and reports no side effects.   The hydrochlorothiazide caused low sodium and she had to discontinue.           Review of Systems  Constitutional, HEENT, cardiovascular, pulmonary, GI, , musculoskeletal, neuro, skin, endocrine and psych systems are negative, except as otherwise noted.      Objective    /82   Pulse 106   Temp 97.3  F (36.3  C) (Tympanic)   Ht 1.613 m (5' 3.5\")   Wt 59.9 kg (132 lb)   LMP  (LMP Unknown)   SpO2 100%   Breastfeeding No   BMI 23.02 kg/m    Body mass index is 23.02 kg/m .  Physical Exam   GENERAL: alert and no distress  PSYCH: mentation appears normal, affect normal/bright            Signed Electronically by: Kristen M. Kehr, PA-C    "

## 2024-09-02 DIAGNOSIS — G43.009 MIGRAINE WITHOUT AURA AND WITHOUT STATUS MIGRAINOSUS, NOT INTRACTABLE: ICD-10-CM

## 2024-09-02 RX ORDER — RIZATRIPTAN BENZOATE 10 MG/1
TABLET, ORALLY DISINTEGRATING ORAL
Qty: 12 TABLET | Refills: 3 | Status: SHIPPED | OUTPATIENT
Start: 2024-09-02

## 2024-11-15 ENCOUNTER — LAB (OUTPATIENT)
Dept: LAB | Facility: CLINIC | Age: 54
End: 2024-11-15
Payer: COMMERCIAL

## 2024-11-15 DIAGNOSIS — B35.1 DERMATOPHYTOSIS OF NAIL: ICD-10-CM

## 2024-11-15 PROCEDURE — 36415 COLL VENOUS BLD VENIPUNCTURE: CPT

## 2024-11-15 PROCEDURE — 80076 HEPATIC FUNCTION PANEL: CPT

## 2024-11-16 LAB
ALBUMIN SERPL BCG-MCNC: 4.4 G/DL (ref 3.5–5.2)
ALP SERPL-CCNC: 75 U/L (ref 40–150)
ALT SERPL W P-5'-P-CCNC: 28 U/L (ref 0–50)
AST SERPL W P-5'-P-CCNC: 32 U/L (ref 0–45)
BILIRUB DIRECT SERPL-MCNC: <0.2 MG/DL (ref 0–0.3)
BILIRUB SERPL-MCNC: 0.2 MG/DL
PROT SERPL-MCNC: 6.8 G/DL (ref 6.4–8.3)

## 2024-12-04 ENCOUNTER — MYC MEDICAL ADVICE (OUTPATIENT)
Dept: FAMILY MEDICINE | Facility: CLINIC | Age: 54
End: 2024-12-04
Payer: COMMERCIAL

## 2024-12-04 DIAGNOSIS — R04.0 EPISTAXIS: Primary | ICD-10-CM

## 2024-12-04 NOTE — TELEPHONE ENCOUNTER
I can put in a referral for ENT, she most likely needs to have cauterization of a vessel in her nose.   We don't have that here in primary care.   She should continue with the use of a saline nasal spray or using a fine layer of vaseline in the nostril to keep moist to avoid dryness contributing.   Please share ENT information for scheduling.  Thank you.   Kristen Kehr PA-C

## 2024-12-11 NOTE — PROGRESS NOTES
Assessment & Plan     Doing well, no nosebleeds after about 2 and half weeks and no obvious vessels on exam.  Patient amenable to deferring cautery at this time.  A little crusting on the septum so we will treat with Bactroban and then have her continue medication at home and try AYR gel as needed as per AVS.  Follow-up as needed      Problem List Items Addressed This Visit    None  Visit Diagnoses       Epistaxis    -  Primary    Nasal crusting        Relevant Medications    mupirocin (BACTROBAN) 2 % external ointment           8 minutes spent on the date of the encounter doing chart review, history and exam, documentation and further activities per the note  {     HEATHER Martinez  [facility] [place]    Subjective     HPI-12/4 Mychart message to PCP:    1 nosebleed in the last 24 hours. 5 in the past month. None really before August 2024.     I purchased a humidifier last week. Intimate with a window open (even now). I was traveling for work so in a hotel last night when the last nosebleed occurred.      Most last 15 minutes and I would describe as moderate. The one on 11/27 was severe and lasted an hour.      I have been pinching the fleshy part of my nose to try and stop the bleeding. (It s always bleeding from my left nostril, never the right).     Have not been picking, no scabs. Most have started after I blow my nose.      Am not taking aspirin. Am on blood pressure meds, have not checked my blood pressure lately.     Today she reports no more nosebleeds since getting a humidifier, last one thanksgiving day.  Denies rhinitis or allergies, always the left side.      Review of Systems   ENT as above      Objective    There were no vitals taken for this visit.    Physical Exam     Constitutional:   The patient was in no acute distress.      Head/Face:   Normocephalic and atraumatic.  No lesions or scars.     Nose:  Anterior rhinoscopy revealed midline septum and absence of purulence or  polyps.  No signs of recent bleeding or prominent vessels    Mouth:  Normal tongue, floor of mouth, buccal mucosa, and palate.  No lesions, ulceration or  masses on inspection, normal voice quality      Oropharynx:  Normal mucosa, palate symmetric with normal elevation. Tonsils  not visualized       Neck:  Supple with normal laryngeal and tracheal landmarks. No palpable thyroid.     Lymphatic:  There is no palpable lymphadenopathy in the neck.

## 2024-12-16 ENCOUNTER — OFFICE VISIT (OUTPATIENT)
Dept: OTOLARYNGOLOGY | Facility: CLINIC | Age: 54
End: 2024-12-16
Attending: PHYSICIAN ASSISTANT
Payer: COMMERCIAL

## 2024-12-16 VITALS — HEART RATE: 85 BPM | SYSTOLIC BLOOD PRESSURE: 132 MMHG | DIASTOLIC BLOOD PRESSURE: 94 MMHG | OXYGEN SATURATION: 99 %

## 2024-12-16 DIAGNOSIS — J34.89 NASAL CRUSTING: ICD-10-CM

## 2024-12-16 DIAGNOSIS — R04.0 EPISTAXIS: Primary | ICD-10-CM

## 2024-12-16 PROCEDURE — 99203 OFFICE O/P NEW LOW 30 MIN: CPT | Performed by: PHYSICIAN ASSISTANT

## 2024-12-16 RX ORDER — MUPIROCIN 20 MG/G
OINTMENT TOPICAL 2 TIMES DAILY
Qty: 22 G | Refills: 1 | Status: SHIPPED | OUTPATIENT
Start: 2024-12-16 | End: 2024-12-30

## 2024-12-16 NOTE — PATIENT INSTRUCTIONS
Try over the counter Ayr gel with aloe just inside the nose daily for moisturizing.         Nosebleeds: Care Instructions  Overview     Nosebleeds are common, especially if you have colds or allergies. Many things can cause a nosebleed.  Some nosebleeds stop on their own with pressure. Others need packing. Some get cauterized (sealed). If you have gauze or other packing materials in your nose, you will need to follow up with your doctor to have the packing removed. You may need more treatment if you get nosebleeds a lot.  Follow-up care is a key part of your treatment and safety. Be sure to make and go to all appointments, and call your doctor if you are having problems. It's also a good idea to know your test results and keep a list of the medicines you take.  How can you care for yourself at home?  If you get another nosebleed:  Gently blow your nose to clear any clots.  Sit up and tilt your head slightly forward. This keeps blood from going down your throat.  Soak a cotton ball in oxymetazolin nasal spray, please gently into opening of nose and apply pressure as below.  Use your thumb and index finger to pinch the front, soft part of your nose shut for at least 15 minutes. Use a clock. Do not check to see if the bleeding has stopped before the 15 minutes are up. If the bleeding has not stopped, pinch your nose shut for another 10 to 15 minutes. Using a nasal decongestant spray such as oxymetazoline (Afrin) before pinching your nose can also help to stop the bleeding. Be safe with medicines. Read and follow all instructions on the label.  When the bleeding has stopped, try not to pick, rub, or blow your nose for several hours. Avoiding these things helps keep your nose from bleeding again.  To prevent nosebleeds  Do not blow your nose too hard.  Try not to lift or strain after a nosebleed.  Raise your head on a pillow while you sleep.  Put a thin layer of a saline- or water-based nasal gel, such as NasoGel, inside  your nose. Put it on the septum, which divides your nostrils. This will prevent dryness that can cause nosebleeds.  Use a vaporizer or humidifier to add moisture to your bedroom. Follow the directions for cleaning the machine.  Do not use aspirin, ibuprofen (Advil, Motrin), or naproxen (Aleve) for 36 to 48 hours after a nosebleed unless your doctor tells you to. You can use acetaminophen (Tylenol) for pain relief.  Talk to your doctor about stopping any other medicines you are taking. Some medicines may make you more likely to get a nosebleed.  Do not use cold medicines or nasal sprays without first talking to your doctor. They can make your nose dry.  Do not snort tobacco, drugs, or any other drying substances up your nose.  When should you call for help?   Call 911 anytime you think you may need emergency care. For example, call if:    You passed out (lost consciousness).   Call your doctor now or seek immediate medical care if:    Your nose is still bleeding after you have pinched the nose shut 2 times for 15 minutes each time (30 minutes total).     There is a lot of blood running down the back of your throat even after you pinch your nose and tilt your head forward.     You feel weak or lightheaded.     You have a nosebleed after a head injury.   Watch closely for changes in your health, and be sure to contact your doctor if:    You get nosebleeds often, even if they stop.     You do not get better as expected.     Where can you learn more?     How to Stop a Nosebleed (01:34)  Your health professional recommends that you watch this short online health video.  Learn simple steps you can use to stop a nosebleed.  Purpose:  Shows the proper way to stop a nosebleed. Includes guidance on when to call a doctor.  Goal:  The user will learn simple steps to stop a nosebleed.     How to watch the video    Scan the QR code   OR Visit the website    https://hwi.se/r/Uxxyitb82uwfd       Go to  "https://www.[employer].net/patiented  Enter S156 in the search box to learn more about \"Nosebleeds: Care Instructions.\"  Current as of: September 27, 2023               Content Version: 14.0    6253-2742 [employer].   Care instructions adapted under license by your healthcare professional. If you have questions about a medical condition or this instruction, always ask your healthcare professional. [employer] disclaims any warranty or liability for your use of this information.   "

## 2024-12-16 NOTE — LETTER
12/16/2024      Sarah Padilla  [id number]  [address]  [place] [address]      Dear Colleague,    Thank you for referring your patient, Sarah Padilla, to the [facility]. Please see a copy of my visit note below.    Assessment & Plan    Doing well, no nosebleeds after about 2 and half weeks and no obvious vessels on exam.  Patient amenable to deferring cautery at this time.  A little crusting on the septum so we will treat with Bactroban and then have her continue medication at home and try AYR gel as needed as per AVS.  Follow-up as needed      Problem List Items Addressed This Visit    None  Visit Diagnoses       Epistaxis    -  Primary    Nasal crusting        Relevant Medications    mupirocin (BACTROBAN) 2 % external ointment           8 minutes spent on the date of the encounter doing chart review, history and exam, documentation and further activities per the note  {     HEATHER Martinez  [facility]    Subjective     HPI-12/4 Mychart message to PCP:    1 nosebleed in the last 24 hours. 5 in the past month. None really before August 2024.     I purchased a humidifier last week. Intimate with a window open (even now). I was traveling for work so in a hotel last night when the last nosebleed occurred.      Most last 15 minutes and I would describe as moderate. The one on 11/27 was severe and lasted an hour.      I have been pinching the fleshy part of my nose to try and stop the bleeding. (It s always bleeding from my left nostril, never the right).     Have not been picking, no scabs. Most have started after I blow my nose.      Am not taking aspirin. Am on blood pressure meds, have not checked my blood pressure lately.     Today she reports no more nosebleeds since getting a humidifier, last one thanksgiving day.  Denies rhinitis or allergies, always the left side.      Review of Systems   ENT as above      Objective    There were no vitals taken  for this visit.    Physical Exam     Constitutional:   The patient was in no acute distress.      Head/Face:   Normocephalic and atraumatic.  No lesions or scars.     Nose:  Anterior rhinoscopy revealed midline septum and absence of purulence or polyps.  No signs of recent bleeding or prominent vessels    Mouth:  Normal tongue, floor of mouth, buccal mucosa, and palate.  No lesions, ulceration or  masses on inspection, normal voice quality      Oropharynx:  Normal mucosa, palate symmetric with normal elevation. Tonsils  not visualized       Neck:  Supple with normal laryngeal and tracheal landmarks. No palpable thyroid.     Lymphatic:  There is no palpable lymphadenopathy in the neck.                    Again, thank you for allowing me to participate in the care of your patient.        Sincerely,        HEATHER Martinez

## 2025-02-10 ENCOUNTER — PATIENT OUTREACH (OUTPATIENT)
Dept: CARE COORDINATION | Facility: CLINIC | Age: 55
End: 2025-02-10
Payer: COMMERCIAL

## 2025-03-03 ENCOUNTER — PATIENT OUTREACH (OUTPATIENT)
Dept: CARE COORDINATION | Facility: CLINIC | Age: 55
End: 2025-03-03
Payer: COMMERCIAL

## 2025-03-10 ENCOUNTER — PATIENT OUTREACH (OUTPATIENT)
Dept: CARE COORDINATION | Facility: CLINIC | Age: 55
End: 2025-03-10
Payer: COMMERCIAL

## 2025-03-10 ENCOUNTER — PATIENT OUTREACH (OUTPATIENT)
Dept: FAMILY MEDICINE | Facility: CLINIC | Age: 55
End: 2025-03-10
Payer: COMMERCIAL

## 2025-03-10 DIAGNOSIS — R87.810 CERVICAL HIGH RISK HPV (HUMAN PAPILLOMAVIRUS) TEST POSITIVE: Primary | ICD-10-CM

## 2025-03-17 ENCOUNTER — PATIENT OUTREACH (OUTPATIENT)
Dept: CARE COORDINATION | Facility: CLINIC | Age: 55
End: 2025-03-17
Payer: COMMERCIAL

## 2025-04-16 ENCOUNTER — ANCILLARY PROCEDURE (OUTPATIENT)
Dept: MAMMOGRAPHY | Facility: CLINIC | Age: 55
End: 2025-04-16
Attending: PHYSICIAN ASSISTANT
Payer: COMMERCIAL

## 2025-04-16 DIAGNOSIS — Z12.31 VISIT FOR SCREENING MAMMOGRAM: ICD-10-CM

## 2025-04-16 PROCEDURE — 77063 BREAST TOMOSYNTHESIS BI: CPT

## 2025-04-16 PROCEDURE — 77067 SCR MAMMO BI INCL CAD: CPT

## 2025-04-30 ENCOUNTER — TELEPHONE (OUTPATIENT)
Dept: FAMILY MEDICINE | Facility: CLINIC | Age: 55
End: 2025-04-30
Payer: COMMERCIAL

## 2025-04-30 NOTE — TELEPHONE ENCOUNTER
Reason for Call:  Appointment Request    Patient requesting this type of appt:  Preventive     Requested provider: Kehr, Kristen M    Reason patient unable to be scheduled: Not within requested timeframe    When does patient want to be seen/preferred time:  1-4 weeks     Comments: She is due for a physical and trying to get in within the next month     Could we send this information to you in [facility] or would you prefer to receive a phone call?:   No preference   Okay to leave a detailed message?: Yes at Cell number on file:    Telephone Information:   Mobile [phone]       Call taken on 4/30/2025 at 8:49 AM by Kate Jeffrey

## 2025-05-18 SDOH — HEALTH STABILITY: PHYSICAL HEALTH: ON AVERAGE, HOW MANY DAYS PER WEEK DO YOU ENGAGE IN MODERATE TO STRENUOUS EXERCISE (LIKE A BRISK WALK)?: 2 DAYS

## 2025-05-18 SDOH — HEALTH STABILITY: PHYSICAL HEALTH: ON AVERAGE, HOW MANY MINUTES DO YOU ENGAGE IN EXERCISE AT THIS LEVEL?: 60 MIN

## 2025-05-18 ASSESSMENT — SOCIAL DETERMINANTS OF HEALTH (SDOH): HOW OFTEN DO YOU GET TOGETHER WITH FRIENDS OR RELATIVES?: ONCE A WEEK

## 2025-05-20 ENCOUNTER — OFFICE VISIT (OUTPATIENT)
Dept: FAMILY MEDICINE | Facility: CLINIC | Age: 55
End: 2025-05-20
Payer: COMMERCIAL

## 2025-05-20 VITALS
WEIGHT: 135 LBS | OXYGEN SATURATION: 100 % | BODY MASS INDEX: 22.49 KG/M2 | HEART RATE: 85 BPM | TEMPERATURE: 97.8 F | DIASTOLIC BLOOD PRESSURE: 84 MMHG | HEIGHT: 65 IN | SYSTOLIC BLOOD PRESSURE: 134 MMHG | RESPIRATION RATE: 16 BRPM

## 2025-05-20 DIAGNOSIS — R87.810 CERVICAL HIGH RISK HPV (HUMAN PAPILLOMAVIRUS) TEST POSITIVE: ICD-10-CM

## 2025-05-20 DIAGNOSIS — Z00.00 ROUTINE GENERAL MEDICAL EXAMINATION AT A HEALTH CARE FACILITY: Primary | ICD-10-CM

## 2025-05-20 DIAGNOSIS — I10 ESSENTIAL HYPERTENSION: ICD-10-CM

## 2025-05-20 DIAGNOSIS — Z12.4 CERVICAL CANCER SCREENING: ICD-10-CM

## 2025-05-20 PROBLEM — R03.0 ELEVATED BP WITHOUT DIAGNOSIS OF HYPERTENSION: Status: RESOLVED | Noted: 2021-12-15 | Resolved: 2025-05-20

## 2025-05-20 PROBLEM — B35.1 ONYCHOMYCOSIS: Status: RESOLVED | Noted: 2021-12-15 | Resolved: 2025-05-20

## 2025-05-20 LAB
ANION GAP SERPL CALCULATED.3IONS-SCNC: 13 MMOL/L (ref 7–15)
BASOPHILS # BLD AUTO: 0 10E3/UL (ref 0–0.2)
BASOPHILS NFR BLD AUTO: 1 %
BUN SERPL-MCNC: 8 MG/DL (ref 6–20)
CALCIUM SERPL-MCNC: 9.4 MG/DL (ref 8.8–10.4)
CHLORIDE SERPL-SCNC: 100 MMOL/L (ref 98–107)
CHOLEST SERPL-MCNC: 209 MG/DL
CREAT SERPL-MCNC: 0.76 MG/DL (ref 0.51–0.95)
EGFRCR SERPLBLD CKD-EPI 2021: >90 ML/MIN/1.73M2
EOSINOPHIL # BLD AUTO: 0 10E3/UL (ref 0–0.7)
EOSINOPHIL NFR BLD AUTO: 1 %
ERYTHROCYTE [DISTWIDTH] IN BLOOD BY AUTOMATED COUNT: 12.9 % (ref 10–15)
FASTING STATUS PATIENT QL REPORTED: NO
FASTING STATUS PATIENT QL REPORTED: NO
GLUCOSE SERPL-MCNC: 96 MG/DL (ref 70–99)
HCO3 SERPL-SCNC: 25 MMOL/L (ref 22–29)
HCT VFR BLD AUTO: 42.7 % (ref 35–47)
HDLC SERPL-MCNC: 129 MG/DL
HGB BLD-MCNC: 14.4 G/DL (ref 11.7–15.7)
IMM GRANULOCYTES # BLD: 0 10E3/UL
IMM GRANULOCYTES NFR BLD: 0 %
LDLC SERPL CALC-MCNC: 70 MG/DL
LYMPHOCYTES # BLD AUTO: 1 10E3/UL (ref 0.8–5.3)
LYMPHOCYTES NFR BLD AUTO: 15 %
MCH RBC QN AUTO: 32.4 PG (ref 26.5–33)
MCHC RBC AUTO-ENTMCNC: 33.7 G/DL (ref 31.5–36.5)
MCV RBC AUTO: 96 FL (ref 78–100)
MONOCYTES # BLD AUTO: 0.4 10E3/UL (ref 0–1.3)
MONOCYTES NFR BLD AUTO: 6 %
NEUTROPHILS # BLD AUTO: 4.9 10E3/UL (ref 1.6–8.3)
NEUTROPHILS NFR BLD AUTO: 77 %
NONHDLC SERPL-MCNC: 80 MG/DL
PLATELET # BLD AUTO: 319 10E3/UL (ref 150–450)
POTASSIUM SERPL-SCNC: 3.4 MMOL/L (ref 3.4–5.3)
RBC # BLD AUTO: 4.44 10E6/UL (ref 3.8–5.2)
SODIUM SERPL-SCNC: 138 MMOL/L (ref 135–145)
TRIGL SERPL-MCNC: 50 MG/DL
WBC # BLD AUTO: 6.4 10E3/UL (ref 4–11)

## 2025-05-20 PROCEDURE — 99396 PREV VISIT EST AGE 40-64: CPT | Performed by: PHYSICIAN ASSISTANT

## 2025-05-20 PROCEDURE — G2211 COMPLEX E/M VISIT ADD ON: HCPCS | Performed by: PHYSICIAN ASSISTANT

## 2025-05-20 PROCEDURE — 1126F AMNT PAIN NOTED NONE PRSNT: CPT | Performed by: PHYSICIAN ASSISTANT

## 2025-05-20 PROCEDURE — 3075F SYST BP GE 130 - 139MM HG: CPT | Performed by: PHYSICIAN ASSISTANT

## 2025-05-20 PROCEDURE — 99213 OFFICE O/P EST LOW 20 MIN: CPT | Mod: 25 | Performed by: PHYSICIAN ASSISTANT

## 2025-05-20 PROCEDURE — 36415 COLL VENOUS BLD VENIPUNCTURE: CPT | Performed by: PHYSICIAN ASSISTANT

## 2025-05-20 PROCEDURE — 80061 LIPID PANEL: CPT | Performed by: PHYSICIAN ASSISTANT

## 2025-05-20 PROCEDURE — 3079F DIAST BP 80-89 MM HG: CPT | Performed by: PHYSICIAN ASSISTANT

## 2025-05-20 PROCEDURE — 87624 HPV HI-RISK TYP POOLED RSLT: CPT | Performed by: PHYSICIAN ASSISTANT

## 2025-05-20 PROCEDURE — 85025 COMPLETE CBC W/AUTO DIFF WBC: CPT | Performed by: PHYSICIAN ASSISTANT

## 2025-05-20 PROCEDURE — 80048 BASIC METABOLIC PNL TOTAL CA: CPT | Performed by: PHYSICIAN ASSISTANT

## 2025-05-20 RX ORDER — AMLODIPINE BESYLATE 10 MG/1
10 TABLET ORAL DAILY
Qty: 90 TABLET | Refills: 3 | Status: SHIPPED | OUTPATIENT
Start: 2025-05-20

## 2025-05-20 ASSESSMENT — PAIN SCALES - GENERAL: PAINLEVEL_OUTOF10: NO PAIN (0)

## 2025-05-20 NOTE — PATIENT INSTRUCTIONS
Patient Education   Preventive Care Advice   This is general advice given by our system to help you stay healthy. However, your care team may have specific advice just for you. Please talk to your care team about your preventive care needs.  Nutrition  Eat 5 or more servings of fruits and vegetables each day.  Try wheat bread, brown rice and whole grain pasta (instead of white bread, rice, and pasta).  Get enough calcium and vitamin D. Check the label on foods and aim for 100% of the RDA (recommended daily allowance).  Lifestyle  Exercise at least 150 minutes each week  (30 minutes a day, 5 days a week).  Do muscle strengthening activities 2 days a week. These help control your weight and prevent disease.  No smoking.  Wear sunscreen to prevent skin cancer.  Have a dental exam and cleaning every 6 months.  Yearly exams  See your health care team every year to talk about:  Any changes in your health.  Any medicines your care team has prescribed.  Preventive care, family planning, and ways to prevent chronic diseases.  Shots (vaccines)   HPV shots (up to age 26), if you've never had them before.  Hepatitis B shots (up to age 59), if you've never had them before.  COVID-19 shot: Get this shot when it's due.  Flu shot: Get a flu shot every year.  Tetanus shot: Get a tetanus shot every 10 years.  Pneumococcal, hepatitis A, and RSV shots: Ask your care team if you need these based on your risk.  Shingles shot (for age 50 and up)  General health tests  Diabetes screening:  Starting at age 35, Get screened for diabetes at least every 3 years.  If you are younger than age 35, ask your care team if you should be screened for diabetes.  Cholesterol test: At age 39, start having a cholesterol test every 5 years, or more often if advised.  Bone density scan (DEXA): At age 50, ask your care team if you should have this scan for osteoporosis (brittle bones).  Hepatitis C: Get tested at least once in your life.  STIs (sexually  transmitted infections)  Before age 24: Ask your care team if you should be screened for STIs.  After age 24: Get screened for STIs if you're at risk. You are at risk for STIs (including HIV) if:  You are sexually active with more than one person.  You don't use condoms every time.  You or a partner was diagnosed with a sexually transmitted infection.  If you are at risk for HIV, ask about PrEP medicine to prevent HIV.  Get tested for HIV at least once in your life, whether you are at risk for HIV or not.  Cancer screening tests  Cervical cancer screening: If you have a cervix, begin getting regular cervical cancer screening tests starting at age 21.  Breast cancer scan (mammogram): If you've ever had breasts, begin having regular mammograms starting at age 40. This is a scan to check for breast cancer.  Colon cancer screening: It is important to start screening for colon cancer at age 45.  Have a colonoscopy test every 10 years (or more often if you're at risk) Or, ask your provider about stool tests like a FIT test every year or Cologuard test every 3 years.  To learn more about your testing options, visit:   .  For help making a decision, visit:   https://bit.ly/qz11297.  Prostate cancer screening test: If you have a prostate, ask your care team if a prostate cancer screening test (PSA) at age 55 is right for you.  Lung cancer screening: If you are a current or former smoker ages 50 to 80, ask your care team if ongoing lung cancer screenings are right for you.  For informational purposes only. Not to replace the advice of your health care provider. Copyright   2023 [employer]. All rights reserved. Clinically reviewed by the [facility]itions [facility]. [employer] [id number] - REV 01/24.

## 2025-05-20 NOTE — PROGRESS NOTES
Preventive Care Visit  [facility] [place] Kristen M. Kehr, PA-C, Family Medicine  May 20, 2025      Assessment & Plan     Routine general medical examination at a health care facility  Health maintenance reviewed and updated.  Defers immunizations.   - Lipid panel reflex to direct LDL Fasting; Future  - Lipid panel reflex to direct LDL Fasting    Essential hypertension  Check BMP today.   She also mentions some mild swelling in ankles off and on. Encouraged plenty of water, decrease the sodium intake. Consider compression stockings  - Basic metabolic panel  (Ca, Cl, CO2, Creat, Gluc, K, Na, BUN); Future  - amLODIPine (NORVASC) 10 MG tablet; Take 1 tablet (10 mg) by mouth daily.  - CBC with platelets and differential; Future  - Basic metabolic panel  (Ca, Cl, CO2, Creat, Gluc, K, Na, BUN)  - CBC with platelets and differential    Cervical cancer screening  Cervical high risk HPV (human papillomavirus) test positive  - HPV and Gynecologic Cytology Panel - Recommended Age 30 - 65 Years      The longitudinal plan of care for the diagnosis(es)/condition(s) as documented were addressed during this visit. Due to the added complexity in care, I will continue to support Sarah in the subsequent management and with ongoing continuity of care.        Counseling  Appropriate preventive services were addressed with this patient via screening, questionnaire, or discussion as appropriate for fall prevention, nutrition, physical activity, Tobacco-use cessation, social engagement, weight loss and cognition.  Checklist reviewing preventive services available has been given to the patient.  Reviewed patient's diet, addressing concerns and/or questions.   She is at risk for lack of exercise and has been provided with information to increase physical activity for the benefit of her well-being.           Lauren Smith is a 55 year old, presenting for the following:  Physical        5/20/2025     9:41 AM   Additional  Questions   Roomed by Bartolo FONTENOT MA          LALITA Smith is here today for routine preventative appointment and will also need refills of medication for hypertension. She has been on amlodipine and doing well.     She was previously treated for migraine, but no longer having headaches as frequently.     She has an IUD and that has helped with heavy bleeding perimenopausal. This worked really well for years, she has started to have some brown discharge from time to time. No pain associated. She is due for cervical cancer screening.            Advance Care Planning    Discussed advance care planning with patient; however, patient declined at this time.        5/18/2025   General Health   How would you rate your overall physical health? Good   Feel stress (tense, anxious, or unable to sleep) Not at all         5/18/2025   Nutrition   Three or more servings of calcium each day? (!) NO   Diet: Regular (no restrictions)   How many servings of fruit and vegetables per day? (!) 0-1   How many sweetened beverages each day? 0-1         5/18/2025   Exercise   Days per week of moderate/strenous exercise 2 days   Average minutes spent exercising at this level 60 min   (!) EXERCISE CONCERN      5/18/2025   Social Factors   Frequency of gathering with friends or relatives Once a week   Worry food won't last until get money to buy more No   Food not last or not have enough money for food? No   Do you have housing? (Housing is defined as stable permanent housing and does not include staying outside in a car, in a tent, in an abandoned building, in an overnight shelter, or couch-surfing.) Yes   Are you worried about losing your housing? No   Lack of transportation? No   Unable to get utilities (heat,electricity)? No         5/18/2025   Fall Risk   Fallen 2 or more times in the past year? No   Trouble with walking or balance? No          5/18/2025   Dental   Dentist two times every year? Yes         Today's PHQ-2 Score:       5/20/2025      9:38 AM   PHQ-2 ( 1999 [employer])   Q1: Little interest or pleasure in doing things 0   Q2: Feeling down, depressed or hopeless 0   PHQ-2 Score 0    Q1: Little interest or pleasure in doing things Not at all   Q2: Feeling down, depressed or hopeless Not at all   PHQ-2 Score 0       Patient-reported           5/18/2025   Substance Use   Alcohol more than 3/day or more than 7/wk No   Do you use any other substances recreationally? No     Social History     Tobacco Use    Smoking status: Never    Smokeless tobacco: Never   Vaping Use    Vaping status: Never Used   Substance Use Topics    Alcohol use: Yes     Comment: occasionally; socially    Drug use: No           4/16/2025   LAST FHS-7 RESULTS   1st degree relative breast or ovarian cancer No   Any relative bilateral breast cancer No   Any male have breast cancer No   Any ONE woman have BOTH breast AND ovarian cancer No   Any woman with breast cancer before 50yrs No   2 or more relatives with breast AND/OR ovarian cancer No   2 or more relatives with breast AND/OR bowel cancer No        Mammogram Screening - Mammogram every 1-2 years updated in Health Maintenance based on mutual decision making        5/18/2025   STI Screening   New sexual partner(s) since last STI/HIV test? No     History of abnormal Pap smear: No - age 30- 64 PAP with HPV every 5 years recommended        Latest Ref Rng & Units 4/1/2024     4:14 PM 2/14/2023     2:29 PM 11/12/2018     5:16 PM   PAP / HPV   PAP  Negative for Intraepithelial Lesion or Malignancy (NILM)  Negative for Intraepithelial Lesion or Malignancy (NILM)     PAP (Historical)    NIL    HPV 16 DNA Negative Negative  Negative     HPV 18 DNA Negative Negative  Negative     Other HR HPV Negative Negative  Positive       ASCVD Risk   The ASCVD Risk score (Bijan DELANEY, et al., 2019) failed to calculate for the following reasons:    The valid HDL cholesterol range is 20 to 100 mg/dL           Reviewed and updated as needed this visit  by Provider   Tobacco  Allergies  Meds  Problems  Med Hx  Surg Hx  Fam Hx            Past Medical History:   Diagnosis Date    Daily headache 10/2/2016    Daily headache     Daily headache     Essential hypertension 2025    Inflammatory arthritis 2012    NO ACTIVE PROBLEMS      Past Surgical History:   Procedure Laterality Date    BUNIONECTOMY Right 2019    Procedure: BUNIONECTOMY Right;  Surgeon: Juan J Bustillos DPM;  Location: [facility] OR    No History of Surgery       OB History    Para Term  AB Living   4 4 4 0 0 3   SAB IAB Ectopic Multiple Live Births   0 0 0 0 4      # Outcome Date GA Lbr Chava/2nd Weight Sex Type Anes PTL Lv   4 Term  41w0d   M    LOI   3 Term  41w0d   M    LOI   2 Term  41w0d   F    DEC   1 Term  41w0d   M    LOI     BP Readings from Last 3 Encounters:   25 134/84   24 (!) 132/94   24 126/82    Wt Readings from Last 3 Encounters:   25 61.2 kg (135 lb)   24 59.9 kg (132 lb)   24 62.1 kg (137 lb)                  Patient Active Problem List   Diagnosis    CARDIOVASCULAR SCREENING; LDL GOAL LESS THAN 160    Inflammatory arthritis    Other iron deficiency anemia    Migraine without aura and without status migrainosus, not intractable    Bunion    DUB (dysfunctional uterine bleeding)    Cervical high risk HPV (human papillomavirus) test positive    Essential hypertension     Past Surgical History:   Procedure Laterality Date    BUNIONECTOMY Right 2019    Procedure: BUNIONECTOMY Right;  Surgeon: Juan J Bustillos DPM;  Location: [facility] OR    No History of Surgery         Social History     Tobacco Use    Smoking status: Never    Smokeless tobacco: Never   Substance Use Topics    Alcohol use: Yes     Comment: occasionally; socially     Family History   Problem Relation Age of Onset    Alzheimer Disease Father     Hypertension Father     Respiratory Maternal Grandmother         Emphysema    Cancer  "Maternal Grandfather         Lung/Skin         Current Outpatient Medications   Medication Sig Dispense Refill    amLODIPine (NORVASC) 10 MG tablet Take 1 tablet (10 mg) by mouth daily. 90 tablet 3    ferrous sulfate (IRON) 325 (65 FE) MG tablet Take 1 tablet (325 mg) by mouth 2 times daily 180 tablet 3    nitroFURantoin macrocrystal (MACRODANTIN) 50 MG capsule TAKE 1 CAPSULE BY MOUTH TO BE TAKEN AFTER INTERCOURSE FOR PREVENTION OF URINARY INFECTION 30 capsule 3    rizatriptan (MAXALT-MLT) 10 MG ODT DISSOLVE 1 TABLET ON THE TONGUE AT ONSET OF HEADACHE FOR MIGRAINE. MAY REPEAT IN 2 HOURS. MAX 3/24 HOURS. STRENGTH: 10 MG 12 tablet 3     No Known Allergies  Recent Labs   Lab Test 11/15/24  1522 08/06/24  0720 04/01/24  1632 05/02/22  1757 07/15/17  0957   A1C  --   --  5.1  --   --    LDL  --   --  73  --  49   HDL  --   --  109  --  74   TRIG  --   --  39  --  44   ALT 28 19  --   --   --    CR  --  0.90 0.71   < >  --    GFRESTIMATED  --  76 >90   < >  --    POTASSIUM  --  3.5 3.2*   < >  --     < > = values in this interval not displayed.               Review of Systems  Constitutional, HEENT, cardiovascular, pulmonary, GI, , musculoskeletal, neuro, skin, endocrine and psych systems are negative, except as otherwise noted.     Objective    Exam  /84   Pulse 85   Temp 97.8  F (36.6  C) (Tympanic)   Resp 16   Ht 1.638 m (5' 4.5\")   Wt 61.2 kg (135 lb)   LMP  (LMP Unknown)   SpO2 100%   Breastfeeding No   BMI 22.81 kg/m     Estimated body mass index is 22.81 kg/m  as calculated from the following:    Height as of this encounter: 1.638 m (5' 4.5\").    Weight as of this encounter: 61.2 kg (135 lb).    Physical Exam  GENERAL: alert and no distress  EYES: Eyes grossly normal to inspection, PERRL and conjunctivae and sclerae normal  HENT: ear canals and TM's normal, nose and mouth without ulcers or lesions  NECK: no adenopathy, no asymmetry, masses, or scars  RESP: lungs clear to auscultation - no rales, " rhonchi or wheezes  BREAST: normal without masses, tenderness or nipple discharge and no palpable axillary masses or adenopathy  CV: regular rate and rhythm, normal S1 S2, no S3 or S4, no murmur, click or rub, no peripheral edema  ABDOMEN: soft, nontender, no hepatosplenomegaly, no masses and bowel sounds normal   (female) w/bimanual: normal female external genitalia, normal urethral meatus, normal vaginal mucosa, and normal cervix/adnexa/uterus without masses. IUD string visualized, there is some brown discharge / old blood.   MS: no gross musculoskeletal defects noted, no edema  SKIN: no suspicious lesions or rashes  NEURO: Normal strength and tone, mentation intact and speech normal  PSYCH: mentation appears normal, affect normal/bright        Signed Electronically by: Kristen M. Kehr, PA-C

## 2025-05-21 ENCOUNTER — RESULTS FOLLOW-UP (OUTPATIENT)
Dept: OBGYN | Facility: CLINIC | Age: 55
End: 2025-05-21

## 2025-05-21 ENCOUNTER — MYC MEDICAL ADVICE (OUTPATIENT)
Dept: FAMILY MEDICINE | Facility: CLINIC | Age: 55
End: 2025-05-21
Payer: COMMERCIAL

## 2025-05-21 LAB
HPV HR 12 DNA CVX QL NAA+PROBE: POSITIVE
HPV16 DNA CVX QL NAA+PROBE: NEGATIVE
HPV18 DNA CVX QL NAA+PROBE: NEGATIVE
HUMAN PAPILLOMA VIRUS FINAL DIAGNOSIS: ABNORMAL

## 2025-07-13 ENCOUNTER — OFFICE VISIT (OUTPATIENT)
Dept: URGENT CARE | Facility: URGENT CARE | Age: 55
End: 2025-07-13
Payer: COMMERCIAL

## 2025-07-13 VITALS
WEIGHT: 134 LBS | SYSTOLIC BLOOD PRESSURE: 160 MMHG | OXYGEN SATURATION: 100 % | BODY MASS INDEX: 22.65 KG/M2 | DIASTOLIC BLOOD PRESSURE: 92 MMHG | TEMPERATURE: 97 F | HEART RATE: 91 BPM | RESPIRATION RATE: 12 BRPM

## 2025-07-13 DIAGNOSIS — T21.21XA: Primary | ICD-10-CM

## 2025-07-13 DIAGNOSIS — T24.219A DEEP PARTIAL THICKNESS BURN OF THIGH: ICD-10-CM

## 2025-07-13 PROCEDURE — 3080F DIAST BP >= 90 MM HG: CPT | Performed by: NURSE PRACTITIONER

## 2025-07-13 PROCEDURE — 99215 OFFICE O/P EST HI 40 MIN: CPT | Performed by: NURSE PRACTITIONER

## 2025-07-13 PROCEDURE — 3077F SYST BP >= 140 MM HG: CPT | Performed by: NURSE PRACTITIONER
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
## 2025-08-17 DIAGNOSIS — N39.0 RECURRENT UTI: ICD-10-CM

## 2025-08-18 RX ORDER — NITROFURANTOIN MACROCRYSTALS 50 MG/1
CAPSULE ORAL
Qty: 30 CAPSULE | Refills: 3 | Status: SHIPPED | OUTPATIENT
Start: 2025-08-18

## (undated) DEVICE — ESU GROUND PAD ADULT W/CORD E7507

## (undated) DEVICE — PIN GUARD 10-1-001 101001PBX

## (undated) DEVICE — PREP CHLORAPREP 26ML TINTED ORANGE  260815

## (undated) DEVICE — GOWN IMPERVIOUS BREATHABLE 2XL/XLONG

## (undated) DEVICE — SYR 10ML LL W/O NDL

## (undated) DEVICE — DRSG STERI STRIP 1/4X3" R1541

## (undated) DEVICE — SU VICRYL 3-0 RB-1 27" UND J215H

## (undated) DEVICE — DRAPE STERI TOWEL SM 1000

## (undated) DEVICE — SOL WATER IRRIG 1000ML BOTTLE 07139-09

## (undated) DEVICE — BNDG KLING 3" 2232

## (undated) DEVICE — PACK EXTREMITY SOP15EXFSD

## (undated) DEVICE — BLADE SAW OSCILLATING STRYK MED 9.0X25X0.38MM 2296-003-111

## (undated) DEVICE — DRSG GAUZE 4X4" TRAY 6939

## (undated) DEVICE — GLOVE PROTEXIS W/NEU-THERA 8.5  2D73TE85

## (undated) DEVICE — IMP SCR SYN CORTEX 2.0X16MM SELF TAP SS 201.816: Type: IMPLANTABLE DEVICE | Site: FOOT | Status: NON-FUNCTIONAL

## (undated) DEVICE — Device

## (undated) DEVICE — SU VICRYL 4-0 PS-2 18" UND J496H

## (undated) DEVICE — NDL 25GA 1.5" 305127

## (undated) DEVICE — ESU PENCIL SMOKE EVAC W/ROCKER SWITCH 0703-047-000

## (undated) DEVICE — NDL 19GA 1.5"

## (undated) DEVICE — DRSG KERLIX 4 1/2"X4YDS ROLL 6715

## (undated) DEVICE — DRAPE C-ARM MINI 5423

## (undated) DEVICE — SU ETHILON 4-0 FS-2 18" 662H

## (undated) RX ORDER — PROPOFOL 10 MG/ML
INJECTION, EMULSION INTRAVENOUS
Status: DISPENSED
Start: 2019-12-31

## (undated) RX ORDER — CEFAZOLIN SODIUM 2 G/100ML
INJECTION, SOLUTION INTRAVENOUS
Status: DISPENSED
Start: 2019-12-31

## (undated) RX ORDER — ONDANSETRON 2 MG/ML
INJECTION INTRAMUSCULAR; INTRAVENOUS
Status: DISPENSED
Start: 2019-12-31

## (undated) RX ORDER — FENTANYL CITRATE 50 UG/ML
INJECTION, SOLUTION INTRAMUSCULAR; INTRAVENOUS
Status: DISPENSED
Start: 2019-12-31

## (undated) RX ORDER — GABAPENTIN 300 MG/1
CAPSULE ORAL
Status: DISPENSED
Start: 2019-12-31

## (undated) RX ORDER — ACETAMINOPHEN 325 MG/1
TABLET ORAL
Status: DISPENSED
Start: 2019-12-31

## (undated) RX ORDER — PHENYLEPHRINE HCL IN 0.9% NACL 1 MG/10 ML
SYRINGE (ML) INTRAVENOUS
Status: DISPENSED
Start: 2019-12-31

## (undated) RX ORDER — KETOROLAC TROMETHAMINE 30 MG/ML
INJECTION, SOLUTION INTRAMUSCULAR; INTRAVENOUS
Status: DISPENSED
Start: 2019-12-31

## (undated) RX ORDER — DEXAMETHASONE SODIUM PHOSPHATE 4 MG/ML
INJECTION, SOLUTION INTRA-ARTICULAR; INTRALESIONAL; INTRAMUSCULAR; INTRAVENOUS; SOFT TISSUE
Status: DISPENSED
Start: 2019-12-31